# Patient Record
Sex: FEMALE | Race: BLACK OR AFRICAN AMERICAN | NOT HISPANIC OR LATINO | URBAN - METROPOLITAN AREA
[De-identification: names, ages, dates, MRNs, and addresses within clinical notes are randomized per-mention and may not be internally consistent; named-entity substitution may affect disease eponyms.]

---

## 2024-10-29 ENCOUNTER — TELEPHONE (OUTPATIENT)
Dept: OBSTETRICS AND GYNECOLOGY | Facility: CLINIC | Age: 24
End: 2024-10-29
Payer: MEDICAID

## 2024-10-31 ENCOUNTER — TELEPHONE (OUTPATIENT)
Dept: OBSTETRICS AND GYNECOLOGY | Facility: CLINIC | Age: 24
End: 2024-10-31

## 2024-10-31 ENCOUNTER — CLINICAL SUPPORT (OUTPATIENT)
Dept: OBSTETRICS AND GYNECOLOGY | Facility: CLINIC | Age: 24
End: 2024-10-31
Payer: MEDICAID

## 2024-10-31 DIAGNOSIS — Z32.00 POSSIBLE PREGNANCY: Primary | ICD-10-CM

## 2024-10-31 PROCEDURE — 99212 OFFICE O/P EST SF 10 MIN: CPT | Mod: PBBFAC

## 2024-10-31 PROCEDURE — 99999 PR PBB SHADOW E&M-EST. PATIENT-LVL II: CPT | Mod: PBBFAC,,,

## 2024-11-14 ENCOUNTER — OFFICE VISIT (OUTPATIENT)
Dept: OBSTETRICS AND GYNECOLOGY | Facility: CLINIC | Age: 24
End: 2024-11-14
Payer: MEDICAID

## 2024-11-14 ENCOUNTER — PROCEDURE VISIT (OUTPATIENT)
Dept: OBSTETRICS AND GYNECOLOGY | Facility: CLINIC | Age: 24
End: 2024-11-14
Payer: MEDICAID

## 2024-11-14 VITALS
WEIGHT: 142 LBS | BODY MASS INDEX: 25.16 KG/M2 | HEIGHT: 63 IN | SYSTOLIC BLOOD PRESSURE: 110 MMHG | DIASTOLIC BLOOD PRESSURE: 64 MMHG

## 2024-11-14 DIAGNOSIS — Z32.01 POSITIVE PREGNANCY TEST: Primary | ICD-10-CM

## 2024-11-14 DIAGNOSIS — Z32.00 POSSIBLE PREGNANCY: ICD-10-CM

## 2024-11-14 DIAGNOSIS — J45.909 CHILDHOOD ASTHMA WITHOUT COMPLICATION, UNSPECIFIED ASTHMA SEVERITY, UNSPECIFIED WHETHER PERSISTENT: ICD-10-CM

## 2024-11-14 PROCEDURE — 87086 URINE CULTURE/COLONY COUNT: CPT | Performed by: ADVANCED PRACTICE MIDWIFE

## 2024-11-14 PROCEDURE — 99999 PR PBB SHADOW E&M-EST. PATIENT-LVL III: CPT | Mod: PBBFAC,,, | Performed by: ADVANCED PRACTICE MIDWIFE

## 2024-11-14 PROCEDURE — 99213 OFFICE O/P EST LOW 20 MIN: CPT | Mod: PBBFAC,TH,PO | Performed by: ADVANCED PRACTICE MIDWIFE

## 2024-11-14 PROCEDURE — 76801 OB US < 14 WKS SINGLE FETUS: CPT | Mod: PBBFAC,PO | Performed by: OBSTETRICS & GYNECOLOGY

## 2024-11-14 PROCEDURE — 87491 CHLMYD TRACH DNA AMP PROBE: CPT | Performed by: ADVANCED PRACTICE MIDWIFE

## 2024-11-14 NOTE — LETTER
November 14, 2024    Genia Fritz  [address] [place] [address]              [place] [facility]  [address] [facility]  [place] [address]  Phone: [phone]    To Whom It May Concern:    Ms. Fritz is currently under our care for pregnancy.  Estimated Date of Delivery: 06/29/2025          Sincerely,    Dana Coats LPN

## 2024-11-14 NOTE — PROGRESS NOTES
CHIEF COMPLAINT:   Patient presents with      Possible Pregnancy        HISTORY OF PRESENT ILLNESS  Genia Fritz 24 y.o.  presents for pregnancy risk assessment.   The patient has no complaints today.  No nausea or vomiting. No bleeding or pain.  Pregnancy was not planned but is desired.  Partner is supportive of pregnancy.  No pets at home.  Works at [email or web address].  Denies domestic abuse.  Denies chemical/pesticide/radiation exposure. Does state that FOB is aware of pregnancy, this is not his first and he will not be involved with the pregnancy.   OB history:      LMP: Patient's last menstrual period was 2024 (exact date).  CHRISTOPHE: 24  EGA: 7w4d      Health Maintenance   Topic Date Due    Hepatitis C Screening  Never done    Lipid Panel  Never done    TETANUS VACCINE  2020    Pap Smear  Never done       Past Medical History:   Diagnosis Date    Mild intermittent asthma, uncomplicated        History reviewed. No pertinent surgical history.    Family History   Problem Relation Name Age of Onset    Hypertension Father      Diabetes Father         Social History     Socioeconomic History    Marital status: Single   Tobacco Use    Smoking status: Never    Smokeless tobacco: Never   Substance and Sexual Activity    Alcohol use: Not Currently    Drug use: Never    Sexual activity: Yes     Partners: Male       Current Outpatient Medications   Medication Sig Dispense Refill    prenatal 25/iron/folate 6/dha (PRENA1 ORAL) Take by mouth.       No current facility-administered medications for this visit.       Review of patient's allergies indicates:  No Known Allergies      PHYSICAL EXAM   Vitals:    24 0903   BP: 110/64        PAIN SCALE: 0/10 None    PHYSICAL EXAM    ROS:  GENERAL: No fever, chills, fatigability or weight loss.  CV: Denies chest pain  PULM: Denies shortness of breath or wheezing.  ABDOMEN: Appetite fine. No weight loss. Denies diarrhea, abdominal pain, hematemesis or blood in  stool.  URINARY: No flank pain, dysuria or hematuria.  REPRODUCTIVE: No abnormal vaginal bleeding.       PE:   APPEARANCE: Well nourished, well developed, in no acute distress  CHEST: Clear to auscultation bilaterally  CV: Regular rate and rhythm  ABDOMEN: Soft. No tenderness or masses. No hepatosplenomegaly. No hernias  PELVIC:   VULVA: No lesions. Normal female genitalia.  URETHRAL MEATUS: Normal size and location, no lesions, no prolapse.  URETHRA: No masses, tenderness, prolapse or scarring.  VAGINA: Moist and well rugated, no discharge, no significant cystocele or rectocele.  CERVIX: No lesions, normal diameter, no stenosis, no cervical motion tenderness.   UTERUS: enlarged size, regular shape, mobile, non-tender, normal position, good support.  ADNEXA: No masses, tenderness or CDS nodularity.  ANUS PERINEUM: No lesions, no relaxation, no external hemorrhoids.     UPT +  Ultrasound today with SVIUP, embryo grossly WNL with normal cardiac activity, no fluid seen in cul-de-sac, 7w4d with CHRISTOPHE 6/29/25, reviewed with pt     A/P:     -      Patient was counseled today on A.C.S. Pap guidelines and recommendations for yearly pelvic exams, mammograms and monthly self breast exams; to see her PCP for other health maintenance and pregnancy.   -      Patient's medications and medical history reviewed with patient and implications in pregnancy.   -      Pregnancy course discussed and 'AtoZ' book given. Patient was counseled on proper weight gain based on the [place] [employer] [facility]'s recommendations based on her pre-pregnancy weight. Discussed foods to avoid in pregnancy (i.e. sushi, fish that are high in mercury, deli meat, and unpasteurized cheeses). Discussed prenatal vitamin options (i.e. stool softener, DHA). Discussed potential medical problems in pregnancy.  -     Discussed risk of Toxoplasmosis transmission from pets and reviewed risk reduction techniques.  -     Pt was counseled on exercise in pregnancy and weight  gain recommendations.  -     Oriented to practice including CNM collaboration.   -     Follow-up initial OB  -     Pap and genprobe today  -     Labs today

## 2024-11-15 LAB — BACTERIA UR CULT: NO GROWTH

## 2024-11-16 LAB
C TRACH DNA SPEC QL NAA+PROBE: DETECTED
N GONORRHOEA DNA SPEC QL NAA+PROBE: NOT DETECTED

## 2024-11-25 ENCOUNTER — TELEPHONE (OUTPATIENT)
Dept: OBSTETRICS AND GYNECOLOGY | Facility: CLINIC | Age: 24
End: 2024-11-25
Payer: MEDICAID

## 2024-11-25 DIAGNOSIS — A74.9 CHLAMYDIA INFECTION AFFECTING PREGNANCY IN FIRST TRIMESTER: Primary | ICD-10-CM

## 2024-11-25 DIAGNOSIS — O98.811 CHLAMYDIA INFECTION AFFECTING PREGNANCY IN FIRST TRIMESTER: Primary | ICD-10-CM

## 2024-11-25 RX ORDER — AZITHROMYCIN 500 MG/1
1000 TABLET, FILM COATED ORAL ONCE
Qty: 2 TABLET | Refills: 0 | Status: SHIPPED | OUTPATIENT
Start: 2024-11-25 | End: 2024-11-25

## 2024-11-25 NOTE — TELEPHONE ENCOUNTER
----- Message from [employer] sent at 11/25/2024 11:05 AM CST -----  Contact: Genia  .Type:  Patient Returning Call    Who Called: Mayra   Who Left Message for Patient: Girish   Does the patient know what this is regarding?: Results   Would the patient rather a call back or a response via [employer]?  Call   Best Call Back Number:.[phone]   Additional Information:

## 2024-11-25 NOTE — TELEPHONE ENCOUNTER
Message from Liu Townsend sent at 11/25/2024  7:52 AM CST -----  Please call pt and let her know chlamydia +. Rx sent to pharmacy. Have her and partner treated prior to resuming intercourse. Thank you.  ----- Message -----  From: [employer] [facility]  Sent: 11/15/2024  10:12 PM CST  To: Liu Townsend CNM     Called patient and after verifying with 2 identifiers the above results discussed and patient verbalized understanding that partner needs testing and treatment.

## 2024-11-25 NOTE — TELEPHONE ENCOUNTER
Attempted to contact pt, no answer, left message to return call to office regarding results and instructions.

## 2024-11-25 NOTE — TELEPHONE ENCOUNTER
----- Message from Liu Townsend sent at 11/25/2024  7:52 AM CST -----  Please call pt and let her know chlamydia +. Rx sent to pharmacy. Have her and partner treated prior to resuming intercourse. Thank you.  ----- Message -----  From: [facility] [employer] [facility]  Sent: 11/15/2024  10:12 PM CST  To: Liu Townsend CNM

## 2024-11-25 NOTE — TELEPHONE ENCOUNTER
----- Message from Liu Townsend sent at 11/25/2024  7:52 AM CST -----  Please call pt and let her know chlamydia +. Rx sent to pharmacy. Have her and partner treated prior to resuming intercourse. Thank you.  ----- Message -----  From: [employer] [facility]  Sent: 11/15/2024  10:12 PM CST  To: Liu Townsend CNM

## 2024-12-18 ENCOUNTER — LAB VISIT (OUTPATIENT)
Dept: LAB | Facility: HOSPITAL | Age: 24
End: 2024-12-18
Attending: ADVANCED PRACTICE MIDWIFE
Payer: MEDICAID

## 2024-12-18 ENCOUNTER — INITIAL PRENATAL (OUTPATIENT)
Dept: OBSTETRICS AND GYNECOLOGY | Facility: CLINIC | Age: 24
End: 2024-12-18
Payer: MEDICAID

## 2024-12-18 VITALS — SYSTOLIC BLOOD PRESSURE: 118 MMHG | DIASTOLIC BLOOD PRESSURE: 60 MMHG

## 2024-12-18 DIAGNOSIS — A74.9 CHLAMYDIA INFECTION AFFECTING PREGNANCY IN FIRST TRIMESTER: ICD-10-CM

## 2024-12-18 DIAGNOSIS — O98.811 CHLAMYDIA INFECTION AFFECTING PREGNANCY IN FIRST TRIMESTER: ICD-10-CM

## 2024-12-18 DIAGNOSIS — Z34.01 ENCOUNTER FOR SUPERVISION OF NORMAL FIRST PREGNANCY IN FIRST TRIMESTER: Primary | ICD-10-CM

## 2024-12-18 DIAGNOSIS — Z34.02 ENCOUNTER FOR SUPERVISION OF NORMAL FIRST PREGNANCY IN SECOND TRIMESTER: ICD-10-CM

## 2024-12-18 DIAGNOSIS — Z32.01 POSITIVE PREGNANCY TEST: ICD-10-CM

## 2024-12-18 LAB
BASOPHILS # BLD AUTO: 0.03 K/UL (ref 0–0.2)
BASOPHILS NFR BLD: 0.3 % (ref 0–1.9)
DIFFERENTIAL METHOD BLD: ABNORMAL
EOSINOPHIL # BLD AUTO: 0 K/UL (ref 0–0.5)
EOSINOPHIL NFR BLD: 0.4 % (ref 0–8)
ERYTHROCYTE [DISTWIDTH] IN BLOOD BY AUTOMATED COUNT: 13.8 % (ref 11.5–14.5)
HCT VFR BLD AUTO: 34.2 % (ref 37–48.5)
HGB BLD-MCNC: 11.1 G/DL (ref 12–16)
IMM GRANULOCYTES # BLD AUTO: 0.04 K/UL (ref 0–0.04)
IMM GRANULOCYTES NFR BLD AUTO: 0.4 % (ref 0–0.5)
LYMPHOCYTES # BLD AUTO: 1.9 K/UL (ref 1–4.8)
LYMPHOCYTES NFR BLD: 19 % (ref 18–48)
MCH RBC QN AUTO: 30.5 PG (ref 27–31)
MCHC RBC AUTO-ENTMCNC: 32.5 G/DL (ref 32–36)
MCV RBC AUTO: 94 FL (ref 82–98)
MONOCYTES # BLD AUTO: 0.7 K/UL (ref 0.3–1)
MONOCYTES NFR BLD: 7 % (ref 4–15)
NEUTROPHILS # BLD AUTO: 7.2 K/UL (ref 1.8–7.7)
NEUTROPHILS NFR BLD: 72.9 % (ref 38–73)
NRBC BLD-RTO: 0 /100 WBC
PLATELET # BLD AUTO: 382 K/UL (ref 150–450)
PMV BLD AUTO: 10.4 FL (ref 9.2–12.9)
RBC # BLD AUTO: 3.64 M/UL (ref 4–5.4)
WBC # BLD AUTO: 9.94 K/UL (ref 3.9–12.7)

## 2024-12-18 PROCEDURE — 86593 SYPHILIS TEST NON-TREP QUANT: CPT | Performed by: ADVANCED PRACTICE MIDWIFE

## 2024-12-18 PROCEDURE — 87389 HIV-1 AG W/HIV-1&-2 AB AG IA: CPT | Performed by: ADVANCED PRACTICE MIDWIFE

## 2024-12-18 PROCEDURE — 99213 OFFICE O/P EST LOW 20 MIN: CPT | Mod: TH,S$PBB,, | Performed by: ADVANCED PRACTICE MIDWIFE

## 2024-12-18 PROCEDURE — 86762 RUBELLA ANTIBODY: CPT | Performed by: ADVANCED PRACTICE MIDWIFE

## 2024-12-18 PROCEDURE — 85025 COMPLETE CBC W/AUTO DIFF WBC: CPT | Performed by: ADVANCED PRACTICE MIDWIFE

## 2024-12-18 PROCEDURE — 99999 PR PBB SHADOW E&M-EST. PATIENT-LVL II: CPT | Mod: PBBFAC,,, | Performed by: ADVANCED PRACTICE MIDWIFE

## 2024-12-18 PROCEDURE — 86901 BLOOD TYPING SEROLOGIC RH(D): CPT | Performed by: ADVANCED PRACTICE MIDWIFE

## 2024-12-18 PROCEDURE — 83021 HEMOGLOBIN CHROMOTOGRAPHY: CPT | Performed by: ADVANCED PRACTICE MIDWIFE

## 2024-12-18 PROCEDURE — 99212 OFFICE O/P EST SF 10 MIN: CPT | Mod: PBBFAC,TH,PO | Performed by: ADVANCED PRACTICE MIDWIFE

## 2024-12-18 PROCEDURE — 87491 CHLMYD TRACH DNA AMP PROBE: CPT

## 2024-12-18 PROCEDURE — 80074 ACUTE HEPATITIS PANEL: CPT | Performed by: ADVANCED PRACTICE MIDWIFE

## 2024-12-18 NOTE — PROGRESS NOTES
24 y.o. female  at 12w3d here for NOB exam  denies VB or cramping  Doing well without concerns   First trimester s/s improving  /60   LMP 2024 (Exact Date)   TW lbs   Reviewed prenatal labs, did not go at pregnancy confirmation, will draw today  Genetic testing wants, will collected today  Enrolled in connected moms    1. Encounter for supervision of normal first pregnancy in first trimester  -     Connected MOM Enrollment  -     Assign Connected MOM Program Consent Questionnaire  -     [id number] Plus W/ Sex Chromosome Abnormalities* T21, T18, T13 & Y + X; Future; Expected date: 2024    2. Chlamydia infection affecting pregnancy in first trimester  Overview:  Rx sent 24  YOSELIN 24    Orders:  -     C. trachomatis/N. gonorrhoeae by AMP DNA [facility]; Vagina    3. Encounter for supervision of normal first pregnancy in second trimester       Genprobe collected today   Reviewed warning signs, pregnancy precautions and how/when to call.  RTC x 4 wks, call or present sooner prn.

## 2024-12-19 LAB
ABO + RH BLD: NORMAL
BLD GP AB SCN CELLS X3 SERPL QL: NORMAL
HAV IGM SERPL QL IA: NORMAL
HBV CORE IGM SERPL QL IA: NORMAL
HBV SURFACE AG SERPL QL IA: NORMAL
HCV AB SERPL QL IA: NORMAL
HGB A2 MFR BLD HPLC: 2.7 % (ref 2.2–3.2)
HGB FRACT BLD ELPH-IMP: NORMAL
HGB FRACT BLD ELPH-IMP: NORMAL
HIV 1+2 AB+HIV1 P24 AG SERPL QL IA: NORMAL
RUBV IGG SER-ACNC: 83.2 IU/ML
RUBV IGG SER-IMP: REACTIVE
SPECIMEN OUTDATE: NORMAL
TREPONEMA PALLIDUM IGG+IGM AB [PRESENCE] IN SERUM OR PLASMA BY IMMUNOASSAY: NONREACTIVE

## 2024-12-20 LAB
C TRACH DNA SPEC QL NAA+PROBE: NOT DETECTED
N GONORRHOEA DNA SPEC QL NAA+PROBE: NOT DETECTED

## 2024-12-26 ENCOUNTER — TELEPHONE (OUTPATIENT)
Dept: OBSTETRICS AND GYNECOLOGY | Facility: CLINIC | Age: 24
End: 2024-12-26
Payer: MEDICAID

## 2024-12-26 NOTE — TELEPHONE ENCOUNTER
----- Message from Jahaira sent at 12/26/2024 10:46 AM CST -----  Contact: Genia  Patient calling to receive a call back at  [phone]. Reports wanting results from [facility].

## 2025-01-12 ENCOUNTER — PATIENT MESSAGE (OUTPATIENT)
Dept: OTHER | Facility: OTHER | Age: 25
End: 2025-01-12
Payer: COMMERCIAL

## 2025-01-15 ENCOUNTER — ROUTINE PRENATAL (OUTPATIENT)
Dept: OBSTETRICS AND GYNECOLOGY | Facility: CLINIC | Age: 25
End: 2025-01-15
Payer: COMMERCIAL

## 2025-01-15 VITALS
BODY MASS INDEX: 26.71 KG/M2 | SYSTOLIC BLOOD PRESSURE: 110 MMHG | DIASTOLIC BLOOD PRESSURE: 60 MMHG | WEIGHT: 150.81 LBS

## 2025-01-15 DIAGNOSIS — O98.811 CHLAMYDIA INFECTION AFFECTING PREGNANCY IN FIRST TRIMESTER: ICD-10-CM

## 2025-01-15 DIAGNOSIS — J45.909 CHILDHOOD ASTHMA WITHOUT COMPLICATION, UNSPECIFIED ASTHMA SEVERITY, UNSPECIFIED WHETHER PERSISTENT: ICD-10-CM

## 2025-01-15 DIAGNOSIS — Z34.02 ENCOUNTER FOR SUPERVISION OF NORMAL FIRST PREGNANCY IN SECOND TRIMESTER: Primary | ICD-10-CM

## 2025-01-15 DIAGNOSIS — A74.9 CHLAMYDIA INFECTION AFFECTING PREGNANCY IN FIRST TRIMESTER: ICD-10-CM

## 2025-01-15 DIAGNOSIS — Z36.89 ENCOUNTER FOR FETAL ANATOMIC SURVEY: ICD-10-CM

## 2025-01-15 PROCEDURE — 99213 OFFICE O/P EST LOW 20 MIN: CPT | Mod: PBBFAC,TH,PO | Performed by: ADVANCED PRACTICE MIDWIFE

## 2025-01-15 PROCEDURE — 0502F SUBSEQUENT PRENATAL CARE: CPT | Mod: S$GLB,,, | Performed by: ADVANCED PRACTICE MIDWIFE

## 2025-01-15 PROCEDURE — 99999 PR PBB SHADOW E&M-EST. PATIENT-LVL III: CPT | Mod: PBBFAC,,, | Performed by: ADVANCED PRACTICE MIDWIFE

## 2025-01-15 NOTE — PROGRESS NOTES
24 y.o. female  at 16w3d   Not feeling flutters, denies VB, LOF or cramping  Doing well without concerns, feeling good  /60   Wt 68.4 kg (150 lb 12.7 oz)   LMP 2024 (Exact Date)   BMI 26.71 kg/m²   TW lbs   Genetic testing: MT21 negative  Anatomy scan ordered for nv    1. Encounter for fetal anatomic survey  -     US OB/GYN Procedure ([facility])-Future; Future; Expected date: 02/15/2025    2. Encounter for supervision of normal first pregnancy in second trimester    3. Childhood asthma without complication, unspecified asthma severity, unspecified whether persistent  Overview:  No Meds      4. Chlamydia infection affecting pregnancy in first trimester  Overview:  Rx sent 24  YOSELIN 24 NEGATIVE           Reviewed warning signs, pregnancy precautions and how/when to call.  RTC x 4 wks, call or present sooner prn.

## 2025-01-19 ENCOUNTER — PATIENT MESSAGE (OUTPATIENT)
Dept: OTHER | Facility: OTHER | Age: 25
End: 2025-01-19
Payer: COMMERCIAL

## 2025-02-09 ENCOUNTER — PATIENT MESSAGE (OUTPATIENT)
Dept: OTHER | Facility: OTHER | Age: 25
End: 2025-02-09
Payer: COMMERCIAL

## 2025-02-13 ENCOUNTER — ROUTINE PRENATAL (OUTPATIENT)
Dept: OBSTETRICS AND GYNECOLOGY | Facility: CLINIC | Age: 25
End: 2025-02-13
Payer: COMMERCIAL

## 2025-02-13 ENCOUNTER — PROCEDURE VISIT (OUTPATIENT)
Dept: OBSTETRICS AND GYNECOLOGY | Facility: CLINIC | Age: 25
End: 2025-02-13
Payer: COMMERCIAL

## 2025-02-13 VITALS
DIASTOLIC BLOOD PRESSURE: 60 MMHG | SYSTOLIC BLOOD PRESSURE: 115 MMHG | WEIGHT: 156.06 LBS | BODY MASS INDEX: 27.65 KG/M2

## 2025-02-13 DIAGNOSIS — O43.112 CIRCUMVALLATE PLACENTA IN SECOND TRIMESTER: ICD-10-CM

## 2025-02-13 DIAGNOSIS — Z34.02 ENCOUNTER FOR SUPERVISION OF NORMAL FIRST PREGNANCY IN SECOND TRIMESTER: Primary | ICD-10-CM

## 2025-02-13 DIAGNOSIS — Z36.89 ENCOUNTER FOR FETAL ANATOMIC SURVEY: ICD-10-CM

## 2025-02-13 DIAGNOSIS — J45.909 CHILDHOOD ASTHMA WITHOUT COMPLICATION, UNSPECIFIED ASTHMA SEVERITY, UNSPECIFIED WHETHER PERSISTENT: ICD-10-CM

## 2025-02-13 PROCEDURE — 99999 PR PBB SHADOW E&M-EST. PATIENT-LVL III: CPT | Mod: PBBFAC,,, | Performed by: ADVANCED PRACTICE MIDWIFE

## 2025-02-13 NOTE — PROGRESS NOTES
24 y.o. female  at 20w4d   Feeling flutters/FM, denies VB, LOF or cramping  Doing well without concerns   /60   Wt 70.8 kg (156 lb 1.4 oz)   LMP 2024 (Exact Date)   BMI 27.65 kg/m²   TWG: 15 lbs   Reviewed anatomy Us today with breech presentation, circumvallate placenta, anterior, 3VC, normal insertion, OMAR WNL, EFW 13%, 11oz, CL 37.1mm, anatomy complete, MFM to review, reviewed with pt   Genetic testing: MT21 negative    1. Encounter for supervision of normal first pregnancy in second trimester    2. Childhood asthma without complication, unspecified asthma severity, unspecified whether persistent  Overview:  No Meds      3. Circumvallate placenta in second trimester  Overview:  Discussed with pt, plan for growth scan at 32 weeks           Reviewed warning signs, normal FM,  labor precautions and how/when to call.  RTC x 4 wks, call or present sooner prn.

## 2025-03-09 ENCOUNTER — PATIENT MESSAGE (OUTPATIENT)
Dept: OTHER | Facility: OTHER | Age: 25
End: 2025-03-09
Payer: MEDICAID

## 2025-03-20 ENCOUNTER — ROUTINE PRENATAL (OUTPATIENT)
Dept: OBSTETRICS AND GYNECOLOGY | Facility: CLINIC | Age: 25
End: 2025-03-20
Payer: MEDICAID

## 2025-03-20 VITALS
WEIGHT: 166.69 LBS | SYSTOLIC BLOOD PRESSURE: 108 MMHG | DIASTOLIC BLOOD PRESSURE: 56 MMHG | BODY MASS INDEX: 29.52 KG/M2

## 2025-03-20 DIAGNOSIS — J45.909 CHILDHOOD ASTHMA WITHOUT COMPLICATION, UNSPECIFIED ASTHMA SEVERITY, UNSPECIFIED WHETHER PERSISTENT: ICD-10-CM

## 2025-03-20 DIAGNOSIS — O43.112 CIRCUMVALLATE PLACENTA IN SECOND TRIMESTER: ICD-10-CM

## 2025-03-20 DIAGNOSIS — Z34.02 ENCOUNTER FOR SUPERVISION OF NORMAL FIRST PREGNANCY IN SECOND TRIMESTER: Primary | ICD-10-CM

## 2025-03-20 PROCEDURE — 99999 PR PBB SHADOW E&M-EST. PATIENT-LVL III: CPT | Mod: PBBFAC,,, | Performed by: ADVANCED PRACTICE MIDWIFE

## 2025-03-20 PROCEDURE — 99213 OFFICE O/P EST LOW 20 MIN: CPT | Mod: PBBFAC,TH,PO | Performed by: ADVANCED PRACTICE MIDWIFE

## 2025-03-23 ENCOUNTER — PATIENT MESSAGE (OUTPATIENT)
Dept: OTHER | Facility: OTHER | Age: 25
End: 2025-03-23
Payer: MEDICAID

## 2025-03-24 NOTE — PROGRESS NOTES
24 y.o. female  at 25w4d   Reports + FM, denies VB, LOF, or cramping  Doing well without concerns   BP (!) 108/56   Wt 75.6 kg (166 lb 10.7 oz)   LMP 2024 (Exact Date)   BMI 29.52 kg/m²   TW lbs   Reviewed upcoming 28wk labs, (B POS) and orders placed  Tdap handout provided and explained    1. Encounter for supervision of normal first pregnancy in second trimester  -     OB Glucose Screen; Future; Expected date: 2025  -     CBC auto differential; Future; Expected date: 2025  -     Treponema Pallidium Antibodies IgG, IgM; Future; Expected date: 2025  -     HIV 1/2 Ag/Ab (4th Gen); Future; Expected date: 2025    2. Circumvallate placenta in second trimester  Overview:  Discussed with pt, plan for growth scan at 32 weeks      3. Childhood asthma without complication, unspecified asthma severity, unspecified whether persistent  Overview:  No Meds           Reviewed warning signs, normal FM,  labor precautions and how/when to call.  RTC x 4 wks, call or present sooner prn.

## 2025-04-06 ENCOUNTER — PATIENT MESSAGE (OUTPATIENT)
Dept: OTHER | Facility: OTHER | Age: 25
End: 2025-04-06
Payer: MEDICAID

## 2025-04-10 ENCOUNTER — ROUTINE PRENATAL (OUTPATIENT)
Dept: OBSTETRICS AND GYNECOLOGY | Facility: CLINIC | Age: 25
End: 2025-04-10
Payer: MEDICAID

## 2025-04-10 ENCOUNTER — LAB VISIT (OUTPATIENT)
Dept: LAB | Facility: HOSPITAL | Age: 25
End: 2025-04-10
Attending: ADVANCED PRACTICE MIDWIFE
Payer: MEDICAID

## 2025-04-10 VITALS
DIASTOLIC BLOOD PRESSURE: 67 MMHG | BODY MASS INDEX: 31.05 KG/M2 | SYSTOLIC BLOOD PRESSURE: 120 MMHG | WEIGHT: 175.25 LBS

## 2025-04-10 DIAGNOSIS — Z34.03 ENCOUNTER FOR SUPERVISION OF NORMAL FIRST PREGNANCY IN THIRD TRIMESTER: Primary | ICD-10-CM

## 2025-04-10 DIAGNOSIS — Z23 NEED FOR DIPHTHERIA-TETANUS-PERTUSSIS (TDAP) VACCINE: ICD-10-CM

## 2025-04-10 DIAGNOSIS — Z34.02 ENCOUNTER FOR SUPERVISION OF NORMAL FIRST PREGNANCY IN SECOND TRIMESTER: ICD-10-CM

## 2025-04-10 DIAGNOSIS — J45.909 CHILDHOOD ASTHMA WITHOUT COMPLICATION, UNSPECIFIED ASTHMA SEVERITY, UNSPECIFIED WHETHER PERSISTENT: ICD-10-CM

## 2025-04-10 LAB
ABSOLUTE EOSINOPHIL (OHS): 0.03 K/UL
ABSOLUTE MONOCYTE (OHS): 0.81 K/UL (ref 0.3–1)
ABSOLUTE NEUTROPHIL COUNT (OHS): 8.93 K/UL (ref 1.8–7.7)
BASOPHILS # BLD AUTO: 0.05 K/UL
BASOPHILS NFR BLD AUTO: 0.4 %
ERYTHROCYTE [DISTWIDTH] IN BLOOD BY AUTOMATED COUNT: 14.3 % (ref 11.5–14.5)
GLUCOSE SERPL-MCNC: 82 MG/DL (ref 70–140)
HCT VFR BLD AUTO: 33.6 % (ref 37–48.5)
HGB BLD-MCNC: 10.6 GM/DL (ref 12–16)
HIV 1+2 AB+HIV1 P24 AG SERPL QL IA: NORMAL
IMM GRANULOCYTES # BLD AUTO: 0.24 K/UL (ref 0–0.04)
IMM GRANULOCYTES NFR BLD AUTO: 2.1 % (ref 0–0.5)
LYMPHOCYTES # BLD AUTO: 1.54 K/UL (ref 1–4.8)
MCH RBC QN AUTO: 31.6 PG (ref 27–31)
MCHC RBC AUTO-ENTMCNC: 31.5 G/DL (ref 32–36)
MCV RBC AUTO: 100 FL (ref 82–98)
NUCLEATED RBC (/100WBC) (OHS): 0 /100 WBC
PLATELET # BLD AUTO: 321 K/UL (ref 150–450)
PMV BLD AUTO: 10.3 FL (ref 9.2–12.9)
RBC # BLD AUTO: 3.35 M/UL (ref 4–5.4)
RELATIVE EOSINOPHIL (OHS): 0.3 %
RELATIVE LYMPHOCYTE (OHS): 13.3 % (ref 18–48)
RELATIVE MONOCYTE (OHS): 7 % (ref 4–15)
RELATIVE NEUTROPHIL (OHS): 76.9 % (ref 38–73)
T PALLIDUM IGG+IGM SER QL: NORMAL
WBC # BLD AUTO: 11.6 K/UL (ref 3.9–12.7)

## 2025-04-10 PROCEDURE — 99999 PR PBB SHADOW E&M-EST. PATIENT-LVL II: CPT | Mod: PBBFAC,,, | Performed by: ADVANCED PRACTICE MIDWIFE

## 2025-04-10 PROCEDURE — 90471 IMMUNIZATION ADMIN: CPT | Mod: PBBFAC,PO

## 2025-04-10 PROCEDURE — 82950 GLUCOSE TEST: CPT

## 2025-04-10 PROCEDURE — 36415 COLL VENOUS BLD VENIPUNCTURE: CPT | Mod: PO

## 2025-04-10 PROCEDURE — 85025 COMPLETE CBC W/AUTO DIFF WBC: CPT

## 2025-04-10 PROCEDURE — 90715 TDAP VACCINE 7 YRS/> IM: CPT | Mod: PBBFAC,PO

## 2025-04-10 PROCEDURE — 99999PBSHW PR PBB SHADOW TECHNICAL ONLY FILED TO HB: Mod: PBBFAC,,,

## 2025-04-10 PROCEDURE — 99212 OFFICE O/P EST SF 10 MIN: CPT | Mod: PBBFAC,TH,PO,25 | Performed by: ADVANCED PRACTICE MIDWIFE

## 2025-04-10 PROCEDURE — 86593 SYPHILIS TEST NON-TREP QUANT: CPT

## 2025-04-10 PROCEDURE — 87389 HIV-1 AG W/HIV-1&-2 AB AG IA: CPT

## 2025-04-10 RX ADMIN — TETANUS TOXOID, REDUCED DIPHTHERIA TOXOID AND ACELLULAR PERTUSSIS VACCINE, ADSORBED 0.5 ML: 5; 2.5; 8; 8; 2.5 SUSPENSION INTRAMUSCULAR at 09:04

## 2025-04-10 NOTE — PROGRESS NOTES
24 y.o. female  at 28w4d   Reports + FM, denies VB, LOF or CTX  Doing well without concerns   /67   Wt 79.5 kg (175 lb 4.3 oz)   LMP 2024 (Exact Date)   BMI 31.05 kg/m²   TW lbs   28wk labs today (B POS)  Tdap today  Will start visits every 2 weeks    1. Encounter for supervision of normal first pregnancy in third trimester    2. Childhood asthma without complication, unspecified asthma severity, unspecified whether persistent  Overview:  No Meds      3. Need for diphtheria-tetanus-pertussis (Tdap) vaccine  -     Tdap (BOOSTRIX) vaccine injection 0.5 mL       Pedi packet given, and signed up for classes  Reviewed warning signs, normal FKCs,  labor precautions and how/when to call.  RTC x 2 wks, call or present sooner prn.

## 2025-04-20 ENCOUNTER — PATIENT MESSAGE (OUTPATIENT)
Dept: OTHER | Facility: OTHER | Age: 25
End: 2025-04-20
Payer: MEDICAID

## 2025-05-04 ENCOUNTER — PATIENT MESSAGE (OUTPATIENT)
Dept: OTHER | Facility: OTHER | Age: 25
End: 2025-05-04
Payer: MEDICAID

## 2025-05-06 ENCOUNTER — TELEPHONE (OUTPATIENT)
Dept: OBSTETRICS AND GYNECOLOGY | Facility: CLINIC | Age: 25
End: 2025-05-06
Payer: MEDICAID

## 2025-05-06 ENCOUNTER — HOSPITAL ENCOUNTER (OUTPATIENT)
Facility: HOSPITAL | Age: 25
Discharge: HOME | End: 2025-05-06
Attending: OBSTETRICS & GYNECOLOGY | Admitting: OBSTETRICS & GYNECOLOGY
Payer: MEDICAID

## 2025-05-06 VITALS
DIASTOLIC BLOOD PRESSURE: 63 MMHG | TEMPERATURE: 98 F | HEART RATE: 101 BPM | OXYGEN SATURATION: 97 % | SYSTOLIC BLOOD PRESSURE: 106 MMHG | RESPIRATION RATE: 18 BRPM

## 2025-05-06 DIAGNOSIS — O26.899 ABDOMINAL PAIN AFFECTING PREGNANCY: Primary | ICD-10-CM

## 2025-05-06 DIAGNOSIS — R10.9 ABDOMINAL PAIN AFFECTING PREGNANCY: Primary | ICD-10-CM

## 2025-05-06 LAB
BILIRUB UR QL STRIP.AUTO: NEGATIVE
CLARITY UR: ABNORMAL
COLOR UR AUTO: YELLOW
GLUCOSE UR QL STRIP: NEGATIVE
HGB UR QL STRIP: NEGATIVE
HOLD SPECIMEN: NORMAL
KETONES UR QL STRIP: NEGATIVE
LEUKOCYTE ESTERASE UR QL STRIP: NEGATIVE
NITRITE UR QL STRIP: NEGATIVE
PH UR STRIP: 8 [PH]
PROT UR QL STRIP: NEGATIVE
SP GR UR STRIP: 1
UROBILINOGEN UR STRIP-ACNC: NEGATIVE EU/DL

## 2025-05-06 PROCEDURE — 87591 N.GONORRHOEAE DNA AMP PROB: CPT | Performed by: MIDWIFE

## 2025-05-06 PROCEDURE — 63600175 PHARM REV CODE 636 W HCPCS: Performed by: MIDWIFE

## 2025-05-06 PROCEDURE — 99211 OFF/OP EST MAY X REQ PHY/QHP: CPT

## 2025-05-06 PROCEDURE — 59025 FETAL NON-STRESS TEST: CPT | Mod: 26,,, | Performed by: MIDWIFE

## 2025-05-06 PROCEDURE — 99213 OFFICE O/P EST LOW 20 MIN: CPT | Mod: TH,25,, | Performed by: MIDWIFE

## 2025-05-06 PROCEDURE — 59025 FETAL NON-STRESS TEST: CPT

## 2025-05-06 PROCEDURE — 81003 URINALYSIS AUTO W/O SCOPE: CPT | Performed by: MIDWIFE

## 2025-05-06 RX ORDER — SODIUM CHLORIDE, SODIUM LACTATE, POTASSIUM CHLORIDE, CALCIUM CHLORIDE 600; 310; 30; 20 MG/100ML; MG/100ML; MG/100ML; MG/100ML
INJECTION, SOLUTION INTRAVENOUS CONTINUOUS
Status: DISCONTINUED | OUTPATIENT
Start: 2025-05-06 | End: 2025-05-06 | Stop reason: HOSPADM

## 2025-05-06 RX ORDER — ONDANSETRON 8 MG/1
8 TABLET, ORALLY DISINTEGRATING ORAL EVERY 8 HOURS PRN
Status: DISCONTINUED | OUTPATIENT
Start: 2025-05-06 | End: 2025-05-06 | Stop reason: HOSPADM

## 2025-05-06 RX ORDER — ACETAMINOPHEN 500 MG
500 TABLET ORAL EVERY 6 HOURS PRN
Status: DISCONTINUED | OUTPATIENT
Start: 2025-05-06 | End: 2025-05-06 | Stop reason: HOSPADM

## 2025-05-06 RX ORDER — MORPHINE SULFATE 10 MG/ML
10 INJECTION INTRAMUSCULAR; INTRAVENOUS; SUBCUTANEOUS ONCE
Refills: 0 | Status: COMPLETED | OUTPATIENT
Start: 2025-05-06 | End: 2025-05-06

## 2025-05-06 RX ORDER — HYDROXYZINE HYDROCHLORIDE 25 MG/ML
50 INJECTION, SOLUTION INTRAMUSCULAR ONCE
Status: COMPLETED | OUTPATIENT
Start: 2025-05-06 | End: 2025-05-06

## 2025-05-06 RX ADMIN — HYDROXYZINE HYDROCHLORIDE 50 MG: 25 INJECTION, SOLUTION INTRAMUSCULAR at 12:05

## 2025-05-06 RX ADMIN — MORPHINE SULFATE 10 MG: 10 INJECTION, SOLUTION INTRAMUSCULAR; INTRAVENOUS at 12:05

## 2025-05-06 RX ADMIN — SODIUM CHLORIDE, POTASSIUM CHLORIDE, SODIUM LACTATE AND CALCIUM CHLORIDE 1000 ML: 600; 310; 30; 20 INJECTION, SOLUTION INTRAVENOUS at 11:05

## 2025-05-06 NOTE — PROCEDURES
Genia Fritz is a 24 y.o. female patient.    Temp: 98.2 °F (36.8 °C) (05/06/25 0950)  Pulse: 95 (05/06/25 1305)  Resp: 18 (05/06/25 1229)  BP: (!) 95/49 (05/06/25 1305)  SpO2: 97 % (05/06/25 1230)       Obtain Fetal nonstress test (NST)    Date/Time: 5/6/2025 1:13 PM    Performed by: Daisha Cuevas CNM  Authorized by: Daisha Cuevas CNM    Nonstress Test:     Variability:  6-25 BPM    Decelerations:  None    Accelerations:  15 bpm    Acoustic Stimulator: No      Baseline:  140    Uterine Irritability: No      Contractions:  Irregular  Biophysical Profile:     Nonstress Test Interpretation: reactive      Overall Impression:  Reassuring  Post-procedure:     Patient tolerance:  Patient tolerated the procedure well with no immediate complications    5/6/2025

## 2025-05-06 NOTE — HOSPITAL COURSE
Continuous EFM/NST  Wet prep negative  SVE closed  UA pending   1300: UA negative, wet prep negative, s/p IV fluid bolus and Vistaril/Morphine. Cervix closed and thick. Pain much improved. Stable for discharge home. Reviewed s/s of  labor, warning precautions, and when to return to LD triage. Keep scheduled apt on Thursday. NST reactive. Contractions spaced on continuous EFM.

## 2025-05-06 NOTE — H&P
[facility] - Labor & Delivery  Obstetrics  History & Physical    Patient Name: Genia Fritz  MRN: [id number]  Admission Date: 2025  Primary Care Provider: No primary care provider on file.    Subjective:     Principal Problem:Abdominal pain affecting pregnancy    History of Present Illness:  24 y.o.  CHRISTOPHE 2025 EGA 32w2d c/o abdominal pain occurring since last night       Obstetric HPI:  Patient reports yes, contractions, active fetal movement, No vaginal bleeding , No loss of fluid     This pregnancy has been complicated by:  Asthma, chlamydia, circumvallate placenta    OB History    Para Term  AB Living   1 0 0 0 0 0   SAB IAB Ectopic Multiple Live Births   0 0 0 0 0      # Outcome Date GA Lbr Shakir/2nd Weight Sex Type Anes PTL Lv   1 Current              Past Medical History:   Diagnosis Date    Mild intermittent asthma, uncomplicated      History reviewed. No pertinent surgical history.    PTA Medications   Medication Sig    prenatal 25/iron/folate 6/dha (PRENA1 ORAL) Take by mouth.       Review of patient's allergies indicates:  No Known Allergies     Family History       Problem Relation (Age of Onset)    Diabetes Father    Hypertension Father          Tobacco Use    Smoking status: Never    Smokeless tobacco: Never   Substance and Sexual Activity    Alcohol use: Not Currently    Drug use: Never    Sexual activity: Not Currently     Partners: Male     Review of Systems   Gastrointestinal:  Positive for abdominal pain.      Objective:     Vital Signs (Most Recent):  Pulse: 91 (25 1000)  BP: 123/74 (25 1000)  SpO2: 99 % (25 1000) Vital Signs (24h Range):  Pulse:  [] 91  SpO2:  [98 %-99 %] 99 %  BP: (123)/(74-76) 123/74        There is no height or weight on file to calculate BMI.    FHT: 150 Cat 1 (reassuring)  TOCO:  Q 4-5  minutes     Physical Exam:   Constitutional: She is oriented to person, place, and time. She appears well-developed and well-nourished.    HENT:    Head: Normocephalic.    Eyes: Conjunctivae are normal.      Pulmonary/Chest: Effort normal.        Abdominal: Soft.     Genitourinary:    Vagina normal.             Musculoskeletal: Normal range of motion.       Neurological: She is alert and oriented to person, place, and time.    Skin: Skin is warm and dry.    Psychiatric: She has a normal mood and affect. Her behavior is normal. Judgment and thought content normal.        Cervix:  Dilation:  0  Effacement:  50%  Station: -3  Presentation: Unable to determine      Significant Labs:  Lab Results   Component Value Date    GROUPTRH B POS 2024    HEPBSAG Non-reactive 2024       I have personallly reviewed all pertinent lab results from the last 24 hours.  Assessment/Plan:     24 y.o. female  at 32w2d for:    * Abdominal pain affecting pregnancy  Continuous EFM/NST  Wet prep negative  SVE closed  UA pending         Chlamydia infection affecting pregnancy in first trimester  YOSELIN negative  Genprobe ordered         Daisha Cuevas CNM  Obstetrics  [facility] - [facility] & Delivery

## 2025-05-06 NOTE — TELEPHONE ENCOUNTER
Patient wheeled in to office by mom after being dropped off by [facility]. She stated she has been in severe pain since 7 am, explains it come and goes. She denies leakage of fluid and or vaginal pressure. Unable to obtain ride to Labor and Delivery for evaluation due to lack of transportation. Vitals stable with blood pressure of 118/72. [facility] contacted for non- emergent transport to Labor and Delivery for evaluation.

## 2025-05-06 NOTE — SUBJECTIVE & OBJECTIVE
Obstetric HPI:  Patient reports yes, contractions, active fetal movement, No vaginal bleeding , No loss of fluid     This pregnancy has been complicated by:  Asthma, chlamydia, circumvallate placenta    OB History    Para Term  AB Living   1 0 0 0 0 0   SAB IAB Ectopic Multiple Live Births   0 0 0 0 0      # Outcome Date GA Lbr Shakir/2nd Weight Sex Type Anes PTL Lv   1 Current              Past Medical History:   Diagnosis Date    Mild intermittent asthma, uncomplicated      History reviewed. No pertinent surgical history.    PTA Medications   Medication Sig    prenatal 25/iron/folate 6/dha (PRENA1 ORAL) Take by mouth.       Review of patient's allergies indicates:  No Known Allergies     Family History       Problem Relation (Age of Onset)    Diabetes Father    Hypertension Father          Tobacco Use    Smoking status: Never    Smokeless tobacco: Never   Substance and Sexual Activity    Alcohol use: Not Currently    Drug use: Never    Sexual activity: Not Currently     Partners: Male     Review of Systems   Gastrointestinal:  Positive for abdominal pain.      Objective:     Vital Signs (Most Recent):  Pulse: 91 (25 1000)  BP: 123/74 (25 1000)  SpO2: 99 % (25 1000) Vital Signs (24h Range):  Pulse:  [] 91  SpO2:  [98 %-99 %] 99 %  BP: (123)/(74-76) 123/74        There is no height or weight on file to calculate BMI.    FHT: 150 Cat 1 (reassuring)  TOCO:  Q 4-5  minutes     Physical Exam:   Constitutional: She is oriented to person, place, and time. She appears well-developed and well-nourished.    HENT:   Head: Normocephalic.    Eyes: Conjunctivae are normal.      Pulmonary/Chest: Effort normal.        Abdominal: Soft.     Genitourinary:    Vagina normal.             Musculoskeletal: Normal range of motion.       Neurological: She is alert and oriented to person, place, and time.    Skin: Skin is warm and dry.    Psychiatric: She has a normal mood and affect. Her behavior is  normal. Judgment and thought content normal.        Cervix:  Dilation:  0  Effacement:  50%  Station: -3  Presentation: Unable to determine      Significant Labs:  Lab Results   Component Value Date    GROUPTR B POS 12/18/2024    HEPBSAG Non-reactive 12/18/2024       I have personallly reviewed all pertinent lab results from the last 24 hours.

## 2025-05-06 NOTE — DISCHARGE SUMMARY
[facility] - Labor & Delivery  Obstetrics  Discharge Summary      Patient Name: Genia Fritz  MRN: [id number]  Admission Date: 2025  Hospital Length of Stay: 0 days  Discharge Date and Time: 2025 1:12 PM  Attending Physician: Salud Butler MD   Discharging Provider: Daisha Cuevas CNM   Primary Care Provider: No primary care provider on file.    HPI: 24 y.o.  CHRISTOPHE 2025 EGA 32w2d c/o abdominal pain occurring since last night       FHT: 140 Cat 1 (reassuring)  TOCO:  none    * No surgery found *     Hospital Course:   Continuous EFM/NST  Wet prep negative  SVE closed  UA pending   1300: UA negative, wet prep negative, s/p IV fluid bolus and Vistaril/Morphine. Cervix closed and thick. Pain much improved. Stable for discharge home. Reviewed s/s of  labor, warning precautions, and when to return to LD triage. Keep scheduled apt on Thursday. NST reactive. Contractions spaced on continuous EFM.         Final Active Diagnoses:    Diagnosis Date Noted POA    PRINCIPAL PROBLEM:  Abdominal pain affecting pregnancy [O26.899, R10.9] 2025 Yes      Problems Resolved During this Admission:        Significant Diagnostic Studies: Labs: All labs within the past 24 hours have been reviewed      Immunizations       None            This patient has no babies on file.  Pending Diagnostic Studies:       Procedure Component Value Units Date/Time    C. trachomatis/N. gonorrhoeae by AMP DNA [facility]; Urine [[id number]] Collected: 25 1044    Order Status: Sent Lab Status: In process Updated: 25 1053    Specimen: Genital from Urine             Discharged Condition: good    Disposition: Home or Self Care    Follow Up:    Patient Instructions:      Notify your health care provider if you experience any of the following:  temperature >100.4     Notify your health care provider if you experience any of the following:  persistent nausea and vomiting or diarrhea     Notify your health care provider if you  experience any of the following:  severe uncontrolled pain     Notify your health care provider if you experience any of the following:  difficulty breathing or increased cough     Notify your health care provider if you experience any of the following:  severe persistent headache     Notify your health care provider if you experience any of the following:  persistent dizziness, light-headedness, or visual disturbances     Notify your health care provider if you experience any of the following:  increased confusion or weakness     Notify your health care provider if you experience any of the following:   Order Comments: Decreased fetal movement, leaking of fluid, and/or vaginal bleeding     Activity as tolerated     Medications:  Current Discharge Medication List        CONTINUE these medications which have NOT CHANGED    Details   prenatal 25/iron/folate 6/dha (PRENA1 ORAL) Take by mouth.             Daisha Cuevas CNM  Obstetrics  [facility] - Labor & Delivery

## 2025-05-08 ENCOUNTER — ROUTINE PRENATAL (OUTPATIENT)
Dept: OBSTETRICS AND GYNECOLOGY | Facility: CLINIC | Age: 25
End: 2025-05-08
Payer: MEDICAID

## 2025-05-08 VITALS
DIASTOLIC BLOOD PRESSURE: 67 MMHG | SYSTOLIC BLOOD PRESSURE: 120 MMHG | WEIGHT: 177.94 LBS | BODY MASS INDEX: 31.52 KG/M2

## 2025-05-08 DIAGNOSIS — Z36.89 ENCOUNTER FOR ULTRASOUND TO ASSESS FETAL GROWTH: ICD-10-CM

## 2025-05-08 DIAGNOSIS — Z34.03 ENCOUNTER FOR SUPERVISION OF NORMAL FIRST PREGNANCY IN THIRD TRIMESTER: Primary | ICD-10-CM

## 2025-05-08 DIAGNOSIS — O43.113 CIRCUMVALLATE PLACENTA IN THIRD TRIMESTER: ICD-10-CM

## 2025-05-08 DIAGNOSIS — J45.909 CHILDHOOD ASTHMA WITHOUT COMPLICATION, UNSPECIFIED ASTHMA SEVERITY, UNSPECIFIED WHETHER PERSISTENT: ICD-10-CM

## 2025-05-08 PROBLEM — R10.9 ABDOMINAL PAIN AFFECTING PREGNANCY: Status: RESOLVED | Noted: 2025-05-06 | Resolved: 2025-05-08

## 2025-05-08 PROBLEM — O26.899 ABDOMINAL PAIN AFFECTING PREGNANCY: Status: RESOLVED | Noted: 2025-05-06 | Resolved: 2025-05-08

## 2025-05-08 PROCEDURE — 99212 OFFICE O/P EST SF 10 MIN: CPT | Mod: PBBFAC,TH,PO | Performed by: ADVANCED PRACTICE MIDWIFE

## 2025-05-08 PROCEDURE — 99999 PR PBB SHADOW E&M-EST. PATIENT-LVL II: CPT | Mod: PBBFAC,,, | Performed by: ADVANCED PRACTICE MIDWIFE

## 2025-05-08 PROCEDURE — 99214 OFFICE O/P EST MOD 30 MIN: CPT | Mod: TH,S$PBB,, | Performed by: ADVANCED PRACTICE MIDWIFE

## 2025-05-10 LAB
C TRACH DNA SPEC QL NAA+PROBE: NOT DETECTED
CTGC SOURCE (OHS) ORD-325: NORMAL
N GONORRHOEA DNA UR QL NAA+PROBE: NOT DETECTED

## 2025-05-13 NOTE — PROGRESS NOTES
24 y.o. female  at 32w4d   Reports + FM, denies VB, LOF or CTX  Doing well without concerns, discussed maternity leave   /67   Wt 80.7 kg (177 lb 14.6 oz)   LMP 2024 (Exact Date)   BMI 31.52 kg/m²   TW lbs   Reviewed 28wk lab results (B POS)  Tdap given at previous visit  U/s at nv for growth     1. Encounter for supervision of normal first pregnancy in third trimester    2. Encounter for ultrasound to assess fetal growth  -     US OB/GYN Procedure ([facility])-Future; Future    3. Circumvallate placenta in third trimester  Overview:  Discussed with pt, plan for growth scan at 32 weeks      4. Childhood asthma without complication, unspecified asthma severity, unspecified whether persistent  Overview:  No Meds      Reviewed warning signs, normal FKCs,  labor precautions and how/when to call.  RTC x 2 wks, call or present sooner prn.

## 2025-05-22 ENCOUNTER — PROCEDURE VISIT (OUTPATIENT)
Dept: OBSTETRICS AND GYNECOLOGY | Facility: CLINIC | Age: 25
End: 2025-05-22
Payer: MEDICAID

## 2025-05-22 ENCOUNTER — ROUTINE PRENATAL (OUTPATIENT)
Dept: OBSTETRICS AND GYNECOLOGY | Facility: CLINIC | Age: 25
End: 2025-05-22
Payer: MEDICAID

## 2025-05-22 VITALS
BODY MASS INDEX: 32.22 KG/M2 | WEIGHT: 181.88 LBS | DIASTOLIC BLOOD PRESSURE: 64 MMHG | SYSTOLIC BLOOD PRESSURE: 122 MMHG

## 2025-05-22 DIAGNOSIS — Z36.89 ENCOUNTER FOR ULTRASOUND TO ASSESS FETAL GROWTH: ICD-10-CM

## 2025-05-22 DIAGNOSIS — J45.909 CHILDHOOD ASTHMA WITHOUT COMPLICATION, UNSPECIFIED ASTHMA SEVERITY, UNSPECIFIED WHETHER PERSISTENT: ICD-10-CM

## 2025-05-22 DIAGNOSIS — O43.113 CIRCUMVALLATE PLACENTA IN THIRD TRIMESTER: ICD-10-CM

## 2025-05-22 DIAGNOSIS — O36.5930 POOR FETAL GROWTH AFFECTING MANAGEMENT OF MOTHER IN THIRD TRIMESTER, SINGLE OR UNSPECIFIED FETUS: Primary | ICD-10-CM

## 2025-05-22 PROCEDURE — 76819 FETAL BIOPHYS PROFIL W/O NST: CPT | Mod: PBBFAC,PO | Performed by: STUDENT IN AN ORGANIZED HEALTH CARE EDUCATION/TRAINING PROGRAM

## 2025-05-22 PROCEDURE — 76816 OB US FOLLOW-UP PER FETUS: CPT | Mod: 26,S$PBB,, | Performed by: STUDENT IN AN ORGANIZED HEALTH CARE EDUCATION/TRAINING PROGRAM

## 2025-05-22 PROCEDURE — 99999 PR PBB SHADOW E&M-EST. PATIENT-LVL II: CPT | Mod: PBBFAC,,, | Performed by: ADVANCED PRACTICE MIDWIFE

## 2025-05-22 PROCEDURE — 99212 OFFICE O/P EST SF 10 MIN: CPT | Mod: PBBFAC,TH,PO | Performed by: ADVANCED PRACTICE MIDWIFE

## 2025-05-22 PROCEDURE — 76820 UMBILICAL ARTERY ECHO: CPT | Mod: 26,S$PBB,, | Performed by: STUDENT IN AN ORGANIZED HEALTH CARE EDUCATION/TRAINING PROGRAM

## 2025-05-22 NOTE — PROGRESS NOTES
24 y.o. female  at 34w4d  Reports + FM, denies VB, LOF or regular CTX  Doing well without concerns  /64   Wt 82.5 kg (181 lb 14.1 oz)   LMP 2024 (Exact Date)   BMI 32.22 kg/m²   TW lbs   Growth US today with cephalic presentation, anterior/circumvallate placenta, OMAR WNL, MVP 7.0cm, EFW 7%, 4lb 5oz, BPP 8/8, S/D ratios WNL, lagging head and femur, discussed with pt and her mother, delivery timing, 2x weekly testing, stressed FKCs    1. Poor fetal growth affecting management of mother in third trimester, single or unspecified fetus  Overview:  25: EFW 7%    Orders:  -     US OB/GYN Procedure ([facility])-Future; Standing    2. Childhood asthma without complication, unspecified asthma severity, unspecified whether persistent  Overview:  No Meds      3. Circumvallate placenta in third trimester  Overview:  Discussed with pt           Reviewed warning signs, normal FKCs, labor precautions and how/when to call.  RTC x 1 wk, call or present sooner prn.

## 2025-05-27 ENCOUNTER — PROCEDURE VISIT (OUTPATIENT)
Dept: OBSTETRICS AND GYNECOLOGY | Facility: CLINIC | Age: 25
End: 2025-05-27
Payer: MEDICAID

## 2025-05-27 DIAGNOSIS — O36.5930 POOR FETAL GROWTH AFFECTING MANAGEMENT OF MOTHER IN THIRD TRIMESTER, SINGLE OR UNSPECIFIED FETUS: ICD-10-CM

## 2025-05-27 PROCEDURE — 76819 FETAL BIOPHYS PROFIL W/O NST: CPT | Mod: PBBFAC | Performed by: OBSTETRICS & GYNECOLOGY

## 2025-05-29 ENCOUNTER — PROCEDURE VISIT (OUTPATIENT)
Dept: OBSTETRICS AND GYNECOLOGY | Facility: CLINIC | Age: 25
End: 2025-05-29
Payer: MEDICAID

## 2025-05-29 DIAGNOSIS — O36.5930 POOR FETAL GROWTH AFFECTING MANAGEMENT OF MOTHER IN THIRD TRIMESTER, SINGLE OR UNSPECIFIED FETUS: ICD-10-CM

## 2025-05-29 PROCEDURE — 76820 UMBILICAL ARTERY ECHO: CPT | Mod: 26,S$PBB,, | Performed by: OBSTETRICS & GYNECOLOGY

## 2025-05-29 PROCEDURE — 76819 FETAL BIOPHYS PROFIL W/O NST: CPT | Mod: PBBFAC,PO | Performed by: OBSTETRICS & GYNECOLOGY

## 2025-06-05 ENCOUNTER — PROCEDURE VISIT (OUTPATIENT)
Dept: OBSTETRICS AND GYNECOLOGY | Facility: CLINIC | Age: 25
End: 2025-06-05
Payer: MEDICAID

## 2025-06-05 ENCOUNTER — ROUTINE PRENATAL (OUTPATIENT)
Dept: OBSTETRICS AND GYNECOLOGY | Facility: CLINIC | Age: 25
End: 2025-06-05
Payer: MEDICAID

## 2025-06-05 VITALS — BODY MASS INDEX: 32.1 KG/M2 | SYSTOLIC BLOOD PRESSURE: 116 MMHG | WEIGHT: 181.19 LBS | DIASTOLIC BLOOD PRESSURE: 68 MMHG

## 2025-06-05 DIAGNOSIS — J45.909 CHILDHOOD ASTHMA WITHOUT COMPLICATION, UNSPECIFIED ASTHMA SEVERITY, UNSPECIFIED WHETHER PERSISTENT: ICD-10-CM

## 2025-06-05 DIAGNOSIS — O36.5930 POOR FETAL GROWTH AFFECTING MANAGEMENT OF MOTHER IN THIRD TRIMESTER, SINGLE OR UNSPECIFIED FETUS: Primary | ICD-10-CM

## 2025-06-05 DIAGNOSIS — O43.113 CIRCUMVALLATE PLACENTA IN THIRD TRIMESTER: ICD-10-CM

## 2025-06-05 DIAGNOSIS — O36.5930 POOR FETAL GROWTH AFFECTING MANAGEMENT OF MOTHER IN THIRD TRIMESTER, SINGLE OR UNSPECIFIED FETUS: ICD-10-CM

## 2025-06-05 PROCEDURE — 99212 OFFICE O/P EST SF 10 MIN: CPT | Mod: PBBFAC,TH,PO | Performed by: ADVANCED PRACTICE MIDWIFE

## 2025-06-05 PROCEDURE — 76819 FETAL BIOPHYS PROFIL W/O NST: CPT | Mod: 26,S$PBB,, | Performed by: OBSTETRICS & GYNECOLOGY

## 2025-06-05 PROCEDURE — 87653 STREP B DNA AMP PROBE: CPT | Performed by: ADVANCED PRACTICE MIDWIFE

## 2025-06-05 PROCEDURE — 99999 PR PBB SHADOW E&M-EST. PATIENT-LVL II: CPT | Mod: PBBFAC,,, | Performed by: ADVANCED PRACTICE MIDWIFE

## 2025-06-05 PROCEDURE — 76820 UMBILICAL ARTERY ECHO: CPT | Mod: PBBFAC,PO | Performed by: OBSTETRICS & GYNECOLOGY

## 2025-06-06 LAB — GROUP B STREPTOCOCCUS, PCR (OHS): NEGATIVE

## 2025-06-09 ENCOUNTER — HOSPITAL ENCOUNTER (OUTPATIENT)
Facility: HOSPITAL | Age: 25
Discharge: HOME | End: 2025-06-09
Attending: OBSTETRICS & GYNECOLOGY | Admitting: OBSTETRICS & GYNECOLOGY
Payer: MEDICAID

## 2025-06-09 VITALS
HEART RATE: 93 BPM | TEMPERATURE: 98 F | WEIGHT: 181.19 LBS | SYSTOLIC BLOOD PRESSURE: 120 MMHG | DIASTOLIC BLOOD PRESSURE: 79 MMHG | HEIGHT: 63 IN | RESPIRATION RATE: 20 BRPM | BODY MASS INDEX: 32.11 KG/M2

## 2025-06-09 DIAGNOSIS — O36.5930 POOR FETAL GROWTH AFFECTING MANAGEMENT OF MOTHER IN THIRD TRIMESTER, SINGLE OR UNSPECIFIED FETUS: Primary | ICD-10-CM

## 2025-06-09 DIAGNOSIS — O36.5930 POOR FETAL GROWTH AFFECTING MANAGEMENT OF MOTHER IN THIRD TRIMESTER: Primary | ICD-10-CM

## 2025-06-09 DIAGNOSIS — O36.5930 POOR FETAL GROWTH AFFECTING MANAGEMENT OF MOTHER IN THIRD TRIMESTER, SINGLE OR UNSPECIFIED FETUS: ICD-10-CM

## 2025-06-09 PROCEDURE — 59025 FETAL NON-STRESS TEST: CPT | Mod: 26,,,

## 2025-06-09 PROCEDURE — G0378 HOSPITAL OBSERVATION PER HR: HCPCS

## 2025-06-09 PROCEDURE — 59025 FETAL NON-STRESS TEST: CPT

## 2025-06-09 NOTE — DISCHARGE INSTRUCTIONS

## 2025-06-09 NOTE — PROCEDURES
"Genia Fritz is a 24 y.o. female patient.    Temp: 98.4 °F (36.9 °C) (25)  Pulse: 99 (25)  Resp: 18 (25)  BP: 119/77 (25)  Weight: 82.2 kg (181 lb 3.5 oz) (25)  Height: 5' 3" (160 cm) (25)       Fetal non-stress test    Date/Time: 2025 1:57 PM    Performed by: Sheila Arreola CNM  Authorized by: Sheila Arreola CNM    Biophysical Profile:     Fetal Breathin    Fetal Movement:  2    Fetal Tone:  2    Fluid Volume:  2    Nonstress Test:  2    Biophysical Profile Score  (of 8):  8    Biophysical Profile Score  (of 10):  10    OMAR (if indicated) (cm):  16    MVP (Maximal Vertical Pocket):  4.7    Nonstress Test Interpretation: reactive      Overall Impression:  Reassuring  Post-procedure:     Patient tolerance:  Patient tolerated the procedure well with no immediate complications     Dopplers WNL       2025    "

## 2025-06-12 ENCOUNTER — ROUTINE PRENATAL (OUTPATIENT)
Dept: OBSTETRICS AND GYNECOLOGY | Facility: CLINIC | Age: 25
End: 2025-06-12
Payer: MEDICAID

## 2025-06-12 VITALS
WEIGHT: 184.06 LBS | DIASTOLIC BLOOD PRESSURE: 70 MMHG | BODY MASS INDEX: 32.61 KG/M2 | SYSTOLIC BLOOD PRESSURE: 118 MMHG

## 2025-06-12 DIAGNOSIS — O36.5990 PREGNANCY AFFECTED BY FETAL GROWTH RESTRICTION: ICD-10-CM

## 2025-06-12 DIAGNOSIS — Z3A.37 37 WEEKS GESTATION OF PREGNANCY: Primary | ICD-10-CM

## 2025-06-12 DIAGNOSIS — O36.5930 POOR FETAL GROWTH AFFECTING MANAGEMENT OF MOTHER IN THIRD TRIMESTER, SINGLE OR UNSPECIFIED FETUS: ICD-10-CM

## 2025-06-12 DIAGNOSIS — O43.113 CIRCUMVALLATE PLACENTA IN THIRD TRIMESTER: ICD-10-CM

## 2025-06-12 DIAGNOSIS — J45.909 CHILDHOOD ASTHMA WITHOUT COMPLICATION, UNSPECIFIED ASTHMA SEVERITY, UNSPECIFIED WHETHER PERSISTENT: ICD-10-CM

## 2025-06-12 PROCEDURE — 99999 PR PBB SHADOW E&M-EST. PATIENT-LVL II: CPT | Mod: PBBFAC,,,

## 2025-06-12 PROCEDURE — 99212 OFFICE O/P EST SF 10 MIN: CPT | Mod: PBBFAC,TH

## 2025-06-12 RX ORDER — OXYTOCIN-SODIUM CHLORIDE 0.9% IV SOLN 30 UNIT/500ML 30-0.9/5 UT/ML-%
10 SOLUTION INTRAVENOUS ONCE AS NEEDED
OUTPATIENT
Start: 2025-06-12 | End: 2036-11-08

## 2025-06-12 RX ORDER — MUPIROCIN 20 MG/G
OINTMENT TOPICAL
OUTPATIENT
Start: 2025-06-12

## 2025-06-12 RX ORDER — DIPHENOXYLATE HYDROCHLORIDE AND ATROPINE SULFATE 2.5; .025 MG/1; MG/1
2 TABLET ORAL EVERY 6 HOURS PRN
OUTPATIENT
Start: 2025-06-12

## 2025-06-12 RX ORDER — ONDANSETRON 8 MG/1
8 TABLET, ORALLY DISINTEGRATING ORAL EVERY 8 HOURS PRN
OUTPATIENT
Start: 2025-06-12

## 2025-06-12 RX ORDER — MISOPROSTOL 200 UG/1
800 TABLET ORAL ONCE AS NEEDED
OUTPATIENT
Start: 2025-06-12 | End: 2036-11-08

## 2025-06-12 RX ORDER — OXYTOCIN/0.9 % SODIUM CHLORIDE 15/250 ML
95 PLASTIC BAG, INJECTION (ML) INTRAVENOUS ONCE AS NEEDED
OUTPATIENT
Start: 2025-06-12 | End: 2036-11-08

## 2025-06-12 RX ORDER — CALCIUM CARBONATE 200(500)MG
500 TABLET,CHEWABLE ORAL 3 TIMES DAILY PRN
OUTPATIENT
Start: 2025-06-12

## 2025-06-12 RX ORDER — CARBOPROST TROMETHAMINE 250 UG/ML
250 INJECTION, SOLUTION INTRAMUSCULAR
OUTPATIENT
Start: 2025-06-12

## 2025-06-12 RX ORDER — MISOPROSTOL 200 UG/1
800 TABLET ORAL ONCE AS NEEDED
OUTPATIENT
Start: 2025-06-12

## 2025-06-12 RX ORDER — OXYTOCIN 10 [USP'U]/ML
10 INJECTION, SOLUTION INTRAMUSCULAR; INTRAVENOUS ONCE AS NEEDED
OUTPATIENT
Start: 2025-06-12 | End: 2036-11-08

## 2025-06-12 RX ORDER — SODIUM CHLORIDE, SODIUM LACTATE, POTASSIUM CHLORIDE, CALCIUM CHLORIDE 600; 310; 30; 20 MG/100ML; MG/100ML; MG/100ML; MG/100ML
INJECTION, SOLUTION INTRAVENOUS CONTINUOUS
OUTPATIENT
Start: 2025-06-12

## 2025-06-12 RX ORDER — METHYLERGONOVINE MALEATE 0.2 MG/ML
200 INJECTION INTRAVENOUS ONCE AS NEEDED
OUTPATIENT
Start: 2025-06-12 | End: 2036-11-08

## 2025-06-12 RX ORDER — SIMETHICONE 80 MG
1 TABLET,CHEWABLE ORAL 4 TIMES DAILY PRN
OUTPATIENT
Start: 2025-06-12

## 2025-06-12 RX ORDER — LIDOCAINE HYDROCHLORIDE 10 MG/ML
10 INJECTION, SOLUTION INFILTRATION; PERINEURAL ONCE AS NEEDED
OUTPATIENT
Start: 2025-06-12 | End: 2036-11-08

## 2025-06-12 RX ORDER — OXYTOCIN-SODIUM CHLORIDE 0.9% IV SOLN 30 UNIT/500ML 30-0.9/5 UT/ML-%
30 SOLUTION INTRAVENOUS ONCE AS NEEDED
OUTPATIENT
Start: 2025-06-12 | End: 2036-11-08

## 2025-06-12 RX ORDER — OXYTOCIN/0.9 % SODIUM CHLORIDE 15/250 ML
95 PLASTIC BAG, INJECTION (ML) INTRAVENOUS CONTINUOUS PRN
OUTPATIENT
Start: 2025-06-12

## 2025-06-12 RX ORDER — TERBUTALINE SULFATE 1 MG/ML
0.25 INJECTION SUBCUTANEOUS
OUTPATIENT
Start: 2025-06-12

## 2025-06-12 NOTE — PROGRESS NOTES
24 y.o. female  at 37w4d   Reports + FM, denies VB, LOF or regular CTX  Doing well without concerns. Ready for baby  VE: soft/posterior/0-50/-4  TW lbs   GBS: Negative    1. 37 weeks gestation of pregnancy  - routine PNC   2. Childhood asthma without complication, unspecified asthma severity, unspecified whether persistent  Overview:  No Meds      3. Circumvallate placenta in third trimester  Overview:  Discussed with pt, plan for growth scan at 32 weeks      4. Poor fetal growth affecting management of mother in third trimester, single or unspecified fetus  Overview:  24: EFW 7%  IOL 25 @ , orders in   cytotec           Reviewed warning signs, normal FKCs, labor precautions and how/when to call. Patient states understanding  IOL 25 @ , call or present sooner prn

## 2025-06-16 ENCOUNTER — HOSPITAL ENCOUNTER (INPATIENT)
Facility: HOSPITAL | Age: 25
LOS: 3 days | Discharge: HOME | End: 2025-06-19
Attending: STUDENT IN AN ORGANIZED HEALTH CARE EDUCATION/TRAINING PROGRAM | Admitting: STUDENT IN AN ORGANIZED HEALTH CARE EDUCATION/TRAINING PROGRAM
Payer: MEDICAID

## 2025-06-16 DIAGNOSIS — O36.5990 PREGNANCY AFFECTED BY FETAL GROWTH RESTRICTION: ICD-10-CM

## 2025-06-16 DIAGNOSIS — O36.5930 POOR FETAL GROWTH AFFECTING MANAGEMENT OF MOTHER IN THIRD TRIMESTER, SINGLE OR UNSPECIFIED FETUS: ICD-10-CM

## 2025-06-16 LAB
ABSOLUTE EOSINOPHIL (OHS): 0.06 K/UL
ABSOLUTE MONOCYTE (OHS): 1.02 K/UL (ref 0.3–1)
ABSOLUTE NEUTROPHIL COUNT (OHS): 6.24 K/UL (ref 1.8–7.7)
ALBUMIN SERPL BCP-MCNC: 3.1 G/DL (ref 3.5–5.2)
ALP SERPL-CCNC: 110 UNIT/L (ref 40–150)
ALT SERPL W/O P-5'-P-CCNC: 9 UNIT/L (ref 10–44)
ANION GAP (OHS): 9 MMOL/L (ref 8–16)
AST SERPL-CCNC: 17 UNIT/L (ref 11–45)
BASOPHILS # BLD AUTO: 0.02 K/UL
BASOPHILS NFR BLD AUTO: 0.2 %
BILIRUB SERPL-MCNC: 0.5 MG/DL (ref 0.1–1)
BUN SERPL-MCNC: 5 MG/DL (ref 6–20)
CALCIUM SERPL-MCNC: 9.3 MG/DL (ref 8.7–10.5)
CHLORIDE SERPL-SCNC: 109 MMOL/L (ref 95–110)
CO2 SERPL-SCNC: 18 MMOL/L (ref 23–29)
CREAT SERPL-MCNC: 0.6 MG/DL (ref 0.5–1.4)
CREAT UR-MCNC: 128.6 MG/DL (ref 15–325)
ERYTHROCYTE [DISTWIDTH] IN BLOOD BY AUTOMATED COUNT: 13.1 % (ref 11.5–14.5)
GFR SERPLBLD CREATININE-BSD FMLA CKD-EPI: >60 ML/MIN/1.73/M2
GLUCOSE SERPL-MCNC: 79 MG/DL (ref 70–110)
GROUP & RH: NORMAL
HCT VFR BLD AUTO: 35.8 % (ref 37–48.5)
HGB BLD-MCNC: 12.2 GM/DL (ref 12–16)
HIV 1+2 AB+HIV1 P24 AG SERPL QL IA: NEGATIVE
IMM GRANULOCYTES # BLD AUTO: 0.12 K/UL (ref 0–0.04)
IMM GRANULOCYTES NFR BLD AUTO: 1.2 % (ref 0–0.5)
INDIRECT COOMBS: NORMAL
LYMPHOCYTES # BLD AUTO: 2.21 K/UL (ref 1–4.8)
MCH RBC QN AUTO: 31.9 PG (ref 27–31)
MCHC RBC AUTO-ENTMCNC: 34.1 G/DL (ref 32–36)
MCV RBC AUTO: 94 FL (ref 82–98)
NUCLEATED RBC (/100WBC) (OHS): 0 /100 WBC
PLATELET # BLD AUTO: 301 K/UL (ref 150–450)
PMV BLD AUTO: 10.6 FL (ref 9.2–12.9)
POTASSIUM SERPL-SCNC: 3.8 MMOL/L (ref 3.5–5.1)
PROT SERPL-MCNC: 7.3 GM/DL (ref 6–8.4)
PROT UR-MCNC: 10 MG/DL
PROT/CREAT UR: 0.08 MG/G{CREAT}
RBC # BLD AUTO: 3.82 M/UL (ref 4–5.4)
RELATIVE EOSINOPHIL (OHS): 0.6 %
RELATIVE LYMPHOCYTE (OHS): 22.9 % (ref 18–48)
RELATIVE MONOCYTE (OHS): 10.5 % (ref 4–15)
RELATIVE NEUTROPHIL (OHS): 64.6 % (ref 38–73)
SODIUM SERPL-SCNC: 136 MMOL/L (ref 136–145)
WBC # BLD AUTO: 9.67 K/UL (ref 3.9–12.7)

## 2025-06-16 PROCEDURE — 87389 HIV-1 AG W/HIV-1&-2 AB AG IA: CPT

## 2025-06-16 PROCEDURE — 25000003 PHARM REV CODE 250

## 2025-06-16 PROCEDURE — 82570 ASSAY OF URINE CREATININE: CPT

## 2025-06-16 PROCEDURE — 86900 BLOOD TYPING SEROLOGIC ABO: CPT

## 2025-06-16 PROCEDURE — 72100002 HC LABOR CARE, 1ST 8 HOURS

## 2025-06-16 PROCEDURE — 85025 COMPLETE CBC W/AUTO DIFF WBC: CPT

## 2025-06-16 PROCEDURE — 80053 COMPREHEN METABOLIC PANEL: CPT

## 2025-06-16 PROCEDURE — 86593 SYPHILIS TEST NON-TREP QUANT: CPT

## 2025-06-16 PROCEDURE — 11000001 HC ACUTE MED/SURG PRIVATE ROOM

## 2025-06-16 PROCEDURE — 63600175 PHARM REV CODE 636 W HCPCS

## 2025-06-16 RX ORDER — DIPHENOXYLATE HYDROCHLORIDE AND ATROPINE SULFATE 2.5; .025 MG/1; MG/1
2 TABLET ORAL EVERY 6 HOURS PRN
Status: DISCONTINUED | OUTPATIENT
Start: 2025-06-16 | End: 2025-06-17

## 2025-06-16 RX ORDER — METHYLERGONOVINE MALEATE 0.2 MG/ML
200 INJECTION INTRAVENOUS ONCE AS NEEDED
Status: DISCONTINUED | OUTPATIENT
Start: 2025-06-16 | End: 2025-06-17

## 2025-06-16 RX ORDER — OXYTOCIN-SODIUM CHLORIDE 0.9% IV SOLN 30 UNIT/500ML 30-0.9/5 UT/ML-%
30 SOLUTION INTRAVENOUS ONCE AS NEEDED
Status: DISCONTINUED | OUTPATIENT
Start: 2025-06-16 | End: 2025-06-17

## 2025-06-16 RX ORDER — CARBOPROST TROMETHAMINE 250 UG/ML
250 INJECTION, SOLUTION INTRAMUSCULAR
Status: DISCONTINUED | OUTPATIENT
Start: 2025-06-16 | End: 2025-06-17

## 2025-06-16 RX ORDER — LIDOCAINE HYDROCHLORIDE 10 MG/ML
10 INJECTION, SOLUTION INFILTRATION; PERINEURAL ONCE AS NEEDED
Status: CANCELLED | OUTPATIENT
Start: 2025-06-16 | End: 2036-11-12

## 2025-06-16 RX ORDER — OXYTOCIN-SODIUM CHLORIDE 0.9% IV SOLN 30 UNIT/500ML 30-0.9/5 UT/ML-%
95 SOLUTION INTRAVENOUS ONCE AS NEEDED
Status: DISCONTINUED | OUTPATIENT
Start: 2025-06-16 | End: 2025-06-17

## 2025-06-16 RX ORDER — OXYTOCIN 10 [USP'U]/ML
10 INJECTION, SOLUTION INTRAMUSCULAR; INTRAVENOUS ONCE AS NEEDED
Status: DISCONTINUED | OUTPATIENT
Start: 2025-06-16 | End: 2025-06-17

## 2025-06-16 RX ORDER — TRANEXAMIC ACID 10 MG/ML
1000 INJECTION, SOLUTION INTRAVENOUS EVERY 30 MIN PRN
Status: DISCONTINUED | OUTPATIENT
Start: 2025-06-16 | End: 2025-06-17

## 2025-06-16 RX ORDER — MISOPROSTOL 200 UG/1
800 TABLET ORAL ONCE AS NEEDED
Status: DISCONTINUED | OUTPATIENT
Start: 2025-06-16 | End: 2025-06-17

## 2025-06-16 RX ORDER — TERBUTALINE SULFATE 1 MG/ML
0.25 INJECTION SUBCUTANEOUS
Status: DISCONTINUED | OUTPATIENT
Start: 2025-06-16 | End: 2025-06-17

## 2025-06-16 RX ORDER — MUPIROCIN 20 MG/G
OINTMENT TOPICAL
Status: DISCONTINUED | OUTPATIENT
Start: 2025-06-16 | End: 2025-06-17

## 2025-06-16 RX ORDER — SIMETHICONE 80 MG
1 TABLET,CHEWABLE ORAL 4 TIMES DAILY PRN
Status: DISCONTINUED | OUTPATIENT
Start: 2025-06-16 | End: 2025-06-17

## 2025-06-16 RX ORDER — ONDANSETRON 8 MG/1
8 TABLET, ORALLY DISINTEGRATING ORAL EVERY 8 HOURS PRN
Status: DISCONTINUED | OUTPATIENT
Start: 2025-06-16 | End: 2025-06-17

## 2025-06-16 RX ORDER — OXYTOCIN-SODIUM CHLORIDE 0.9% IV SOLN 30 UNIT/500ML 30-0.9/5 UT/ML-%
10 SOLUTION INTRAVENOUS ONCE AS NEEDED
Status: COMPLETED | OUTPATIENT
Start: 2025-06-16 | End: 2025-06-17

## 2025-06-16 RX ORDER — CALCIUM CARBONATE 200(500)MG
500 TABLET,CHEWABLE ORAL 3 TIMES DAILY PRN
Status: DISCONTINUED | OUTPATIENT
Start: 2025-06-16 | End: 2025-06-17

## 2025-06-16 RX ORDER — OXYTOCIN-SODIUM CHLORIDE 0.9% IV SOLN 30 UNIT/500ML 30-0.9/5 UT/ML-%
95 SOLUTION INTRAVENOUS CONTINUOUS PRN
Status: DISCONTINUED | OUTPATIENT
Start: 2025-06-16 | End: 2025-06-17

## 2025-06-16 RX ORDER — LIDOCAINE HYDROCHLORIDE 10 MG/ML
10 INJECTION, SOLUTION INFILTRATION; PERINEURAL ONCE AS NEEDED
Status: DISCONTINUED | OUTPATIENT
Start: 2025-06-16 | End: 2025-06-17

## 2025-06-16 RX ORDER — SODIUM CHLORIDE, SODIUM LACTATE, POTASSIUM CHLORIDE, CALCIUM CHLORIDE 600; 310; 30; 20 MG/100ML; MG/100ML; MG/100ML; MG/100ML
INJECTION, SOLUTION INTRAVENOUS CONTINUOUS
Status: DISCONTINUED | OUTPATIENT
Start: 2025-06-16 | End: 2025-06-17

## 2025-06-16 RX ADMIN — MISOPROSTOL 50 MCG: 100 TABLET ORAL at 08:06

## 2025-06-16 RX ADMIN — SODIUM CHLORIDE, POTASSIUM CHLORIDE, SODIUM LACTATE AND CALCIUM CHLORIDE 300 ML: 600; 310; 30; 20 INJECTION, SOLUTION INTRAVENOUS at 09:06

## 2025-06-16 RX ADMIN — SODIUM CHLORIDE, POTASSIUM CHLORIDE, SODIUM LACTATE AND CALCIUM CHLORIDE: 600; 310; 30; 20 INJECTION, SOLUTION INTRAVENOUS at 09:06

## 2025-06-17 ENCOUNTER — ANESTHESIA EVENT (OUTPATIENT)
Dept: OBSTETRICS AND GYNECOLOGY | Facility: HOSPITAL | Age: 25
End: 2025-06-17
Payer: MEDICAID

## 2025-06-17 ENCOUNTER — ANESTHESIA (OUTPATIENT)
Dept: OBSTETRICS AND GYNECOLOGY | Facility: HOSPITAL | Age: 25
End: 2025-06-17
Payer: MEDICAID

## 2025-06-17 LAB
ALLENS TEST: ABNORMAL
HCO3 UR-SCNC: 18.9 MMOL/L (ref 17–27)
PCO2 BLDA: 33.1 MMHG (ref 32–66)
PH SMN: 7.36 [PH] (ref 7.18–7.38)
PO2 BLDA: 39 MMHG (ref 6–31)
POC BE: -7 MMOL/L (ref -2–2)
POC SATURATED O2: 72 %
SAMPLE: ABNORMAL
SITE: ABNORMAL
T PALLIDUM IGG+IGM SER QL: NORMAL

## 2025-06-17 PROCEDURE — 25000003 PHARM REV CODE 250: Performed by: ADVANCED PRACTICE MIDWIFE

## 2025-06-17 PROCEDURE — 63600175 PHARM REV CODE 636 W HCPCS: Performed by: NURSE ANESTHETIST, CERTIFIED REGISTERED

## 2025-06-17 PROCEDURE — 82803 BLOOD GASES ANY COMBINATION: CPT

## 2025-06-17 PROCEDURE — 27200710 HC EPIDURAL INFUSION PUMP SET: Performed by: STUDENT IN AN ORGANIZED HEALTH CARE EDUCATION/TRAINING PROGRAM

## 2025-06-17 PROCEDURE — 63600175 PHARM REV CODE 636 W HCPCS

## 2025-06-17 PROCEDURE — 3E0DXGC INTRODUCTION OF OTHER THERAPEUTIC SUBSTANCE INTO MOUTH AND PHARYNX, EXTERNAL APPROACH: ICD-10-PCS | Performed by: STUDENT IN AN ORGANIZED HEALTH CARE EDUCATION/TRAINING PROGRAM

## 2025-06-17 PROCEDURE — 0KQM0ZZ REPAIR PERINEUM MUSCLE, OPEN APPROACH: ICD-10-PCS | Performed by: STUDENT IN AN ORGANIZED HEALTH CARE EDUCATION/TRAINING PROGRAM

## 2025-06-17 PROCEDURE — 62326 NJX INTERLAMINAR LMBR/SAC: CPT | Performed by: NURSE ANESTHETIST, CERTIFIED REGISTERED

## 2025-06-17 PROCEDURE — 63600175 PHARM REV CODE 636 W HCPCS: Performed by: STUDENT IN AN ORGANIZED HEALTH CARE EDUCATION/TRAINING PROGRAM

## 2025-06-17 PROCEDURE — 72100003 HC LABOR CARE, EA. ADDL. 8 HRS

## 2025-06-17 PROCEDURE — 10907ZC DRAINAGE OF AMNIOTIC FLUID, THERAPEUTIC FROM PRODUCTS OF CONCEPTION, VIA NATURAL OR ARTIFICIAL OPENING: ICD-10-PCS | Performed by: STUDENT IN AN ORGANIZED HEALTH CARE EDUCATION/TRAINING PROGRAM

## 2025-06-17 PROCEDURE — 25000003 PHARM REV CODE 250

## 2025-06-17 PROCEDURE — 3E033VJ INTRODUCTION OF OTHER HORMONE INTO PERIPHERAL VEIN, PERCUTANEOUS APPROACH: ICD-10-PCS | Performed by: STUDENT IN AN ORGANIZED HEALTH CARE EDUCATION/TRAINING PROGRAM

## 2025-06-17 PROCEDURE — 63600175 PHARM REV CODE 636 W HCPCS: Performed by: ADVANCED PRACTICE MIDWIFE

## 2025-06-17 PROCEDURE — 72200006 HC VAGINAL DELIVERY LEVEL III

## 2025-06-17 PROCEDURE — 51702 INSERT TEMP BLADDER CATH: CPT

## 2025-06-17 PROCEDURE — 59200 INSERT CERVICAL DILATOR: CPT

## 2025-06-17 PROCEDURE — 99499 UNLISTED E&M SERVICE: CPT | Mod: ,,,

## 2025-06-17 PROCEDURE — 99900035 HC TECH TIME PER 15 MIN (STAT)

## 2025-06-17 PROCEDURE — 11000001 HC ACUTE MED/SURG PRIVATE ROOM

## 2025-06-17 PROCEDURE — 36416 COLLJ CAPILLARY BLOOD SPEC: CPT

## 2025-06-17 PROCEDURE — 27800516 HC TRAY, EPIDURAL COMBO: Performed by: STUDENT IN AN ORGANIZED HEALTH CARE EDUCATION/TRAINING PROGRAM

## 2025-06-17 RX ORDER — CARBOPROST TROMETHAMINE 250 UG/ML
250 INJECTION, SOLUTION INTRAMUSCULAR
Status: DISCONTINUED | OUTPATIENT
Start: 2025-06-17 | End: 2025-06-19 | Stop reason: HOSPADM

## 2025-06-17 RX ORDER — OXYTOCIN-SODIUM CHLORIDE 0.9% IV SOLN 30 UNIT/500ML 30-0.9/5 UT/ML-%
95 SOLUTION INTRAVENOUS CONTINUOUS PRN
Status: DISCONTINUED | OUTPATIENT
Start: 2025-06-17 | End: 2025-06-19 | Stop reason: HOSPADM

## 2025-06-17 RX ORDER — FENTANYL CITRATE 50 UG/ML
100 INJECTION, SOLUTION INTRAMUSCULAR; INTRAVENOUS ONCE
Refills: 0 | Status: COMPLETED | OUTPATIENT
Start: 2025-06-17 | End: 2025-06-17

## 2025-06-17 RX ORDER — HYDROCORTISONE 25 MG/G
CREAM TOPICAL 3 TIMES DAILY PRN
Status: DISCONTINUED | OUTPATIENT
Start: 2025-06-17 | End: 2025-06-19 | Stop reason: HOSPADM

## 2025-06-17 RX ORDER — ACETAMINOPHEN 325 MG/1
650 TABLET ORAL EVERY 6 HOURS PRN
Status: DISCONTINUED | OUTPATIENT
Start: 2025-06-17 | End: 2025-06-19 | Stop reason: HOSPADM

## 2025-06-17 RX ORDER — BUPIVACAINE HYDROCHLORIDE 2.5 MG/ML
5 INJECTION, SOLUTION EPIDURAL; INFILTRATION; INTRACAUDAL; PERINEURAL
Status: DISCONTINUED | OUTPATIENT
Start: 2025-06-17 | End: 2025-06-17

## 2025-06-17 RX ORDER — DIPHENHYDRAMINE HYDROCHLORIDE 50 MG/ML
12.5 INJECTION, SOLUTION INTRAMUSCULAR; INTRAVENOUS EVERY 4 HOURS PRN
Status: DISCONTINUED | OUTPATIENT
Start: 2025-06-17 | End: 2025-06-17

## 2025-06-17 RX ORDER — MISOPROSTOL 200 UG/1
800 TABLET ORAL ONCE AS NEEDED
Status: DISCONTINUED | OUTPATIENT
Start: 2025-06-17 | End: 2025-06-19 | Stop reason: HOSPADM

## 2025-06-17 RX ORDER — OXYTOCIN-SODIUM CHLORIDE 0.9% IV SOLN 30 UNIT/500ML 30-0.9/5 UT/ML-%
30 SOLUTION INTRAVENOUS ONCE AS NEEDED
Status: DISCONTINUED | OUTPATIENT
Start: 2025-06-17 | End: 2025-06-19 | Stop reason: HOSPADM

## 2025-06-17 RX ORDER — SODIUM CHLORIDE 0.9 % (FLUSH) 0.9 %
10 SYRINGE (ML) INJECTION
Status: DISCONTINUED | OUTPATIENT
Start: 2025-06-17 | End: 2025-06-19 | Stop reason: HOSPADM

## 2025-06-17 RX ORDER — OXYTOCIN-SODIUM CHLORIDE 0.9% IV SOLN 30 UNIT/500ML 30-0.9/5 UT/ML-%
95 SOLUTION INTRAVENOUS ONCE AS NEEDED
Status: DISCONTINUED | OUTPATIENT
Start: 2025-06-17 | End: 2025-06-19 | Stop reason: HOSPADM

## 2025-06-17 RX ORDER — HYDROCODONE BITARTRATE AND ACETAMINOPHEN 5; 325 MG/1; MG/1
1 TABLET ORAL EVERY 4 HOURS PRN
Status: DISCONTINUED | OUTPATIENT
Start: 2025-06-17 | End: 2025-06-19 | Stop reason: HOSPADM

## 2025-06-17 RX ORDER — DOCUSATE SODIUM 100 MG/1
200 CAPSULE, LIQUID FILLED ORAL 2 TIMES DAILY PRN
Status: DISCONTINUED | OUTPATIENT
Start: 2025-06-17 | End: 2025-06-19 | Stop reason: HOSPADM

## 2025-06-17 RX ORDER — DIPHENHYDRAMINE HCL 25 MG
25 CAPSULE ORAL EVERY 4 HOURS PRN
Status: DISCONTINUED | OUTPATIENT
Start: 2025-06-17 | End: 2025-06-19 | Stop reason: HOSPADM

## 2025-06-17 RX ORDER — DIPHENHYDRAMINE HYDROCHLORIDE 50 MG/ML
25 INJECTION, SOLUTION INTRAMUSCULAR; INTRAVENOUS EVERY 4 HOURS PRN
Status: DISCONTINUED | OUTPATIENT
Start: 2025-06-17 | End: 2025-06-19 | Stop reason: HOSPADM

## 2025-06-17 RX ORDER — DIPHENOXYLATE HYDROCHLORIDE AND ATROPINE SULFATE 2.5; .025 MG/1; MG/1
2 TABLET ORAL EVERY 6 HOURS PRN
Status: DISCONTINUED | OUTPATIENT
Start: 2025-06-17 | End: 2025-06-19 | Stop reason: HOSPADM

## 2025-06-17 RX ORDER — PRENATAL WITH FERROUS FUM AND FOLIC ACID 3080; 920; 120; 400; 22; 1.84; 3; 20; 10; 1; 12; 200; 27; 25; 2 [IU]/1; [IU]/1; MG/1; [IU]/1; MG/1; MG/1; MG/1; MG/1; MG/1; MG/1; UG/1; MG/1; MG/1; MG/1; MG/1
1 TABLET ORAL DAILY
Status: DISCONTINUED | OUTPATIENT
Start: 2025-06-18 | End: 2025-06-19 | Stop reason: HOSPADM

## 2025-06-17 RX ORDER — IBUPROFEN 600 MG/1
600 TABLET, FILM COATED ORAL EVERY 6 HOURS
Status: DISCONTINUED | OUTPATIENT
Start: 2025-06-18 | End: 2025-06-19 | Stop reason: HOSPADM

## 2025-06-17 RX ORDER — TRANEXAMIC ACID 10 MG/ML
1000 INJECTION, SOLUTION INTRAVENOUS EVERY 30 MIN PRN
Status: DISCONTINUED | OUTPATIENT
Start: 2025-06-17 | End: 2025-06-19 | Stop reason: HOSPADM

## 2025-06-17 RX ORDER — SIMETHICONE 80 MG
1 TABLET,CHEWABLE ORAL EVERY 6 HOURS PRN
Status: DISCONTINUED | OUTPATIENT
Start: 2025-06-17 | End: 2025-06-19 | Stop reason: HOSPADM

## 2025-06-17 RX ORDER — LIDOCAINE HYDROCHLORIDE AND EPINEPHRINE 15; 5 MG/ML; UG/ML
INJECTION, SOLUTION EPIDURAL
Status: DISCONTINUED | OUTPATIENT
Start: 2025-06-17 | End: 2025-06-17

## 2025-06-17 RX ORDER — OXYTOCIN 10 [USP'U]/ML
10 INJECTION, SOLUTION INTRAMUSCULAR; INTRAVENOUS ONCE AS NEEDED
Status: DISCONTINUED | OUTPATIENT
Start: 2025-06-17 | End: 2025-06-19 | Stop reason: HOSPADM

## 2025-06-17 RX ORDER — ONDANSETRON 8 MG/1
8 TABLET, ORALLY DISINTEGRATING ORAL EVERY 8 HOURS PRN
Status: DISCONTINUED | OUTPATIENT
Start: 2025-06-17 | End: 2025-06-19 | Stop reason: HOSPADM

## 2025-06-17 RX ORDER — OXYTOCIN-SODIUM CHLORIDE 0.9% IV SOLN 30 UNIT/500ML 30-0.9/5 UT/ML-%
0-32 SOLUTION INTRAVENOUS CONTINUOUS
Status: DISCONTINUED | OUTPATIENT
Start: 2025-06-17 | End: 2025-06-17

## 2025-06-17 RX ORDER — METHYLERGONOVINE MALEATE 0.2 MG/ML
200 INJECTION INTRAVENOUS ONCE AS NEEDED
Status: DISCONTINUED | OUTPATIENT
Start: 2025-06-17 | End: 2025-06-19 | Stop reason: HOSPADM

## 2025-06-17 RX ORDER — ROPIVACAINE HYDROCHLORIDE 2 MG/ML
12 INJECTION, SOLUTION EPIDURAL; INFILTRATION CONTINUOUS
Status: DISCONTINUED | OUTPATIENT
Start: 2025-06-17 | End: 2025-06-17

## 2025-06-17 RX ADMIN — SODIUM CHLORIDE, POTASSIUM CHLORIDE, SODIUM LACTATE AND CALCIUM CHLORIDE 1000 ML: 600; 310; 30; 20 INJECTION, SOLUTION INTRAVENOUS at 08:06

## 2025-06-17 RX ADMIN — Medication 2 MILLI-UNITS/MIN: at 05:06

## 2025-06-17 RX ADMIN — FENTANYL CITRATE 100 MCG: 50 INJECTION INTRAMUSCULAR; INTRAVENOUS at 06:06

## 2025-06-17 RX ADMIN — Medication 2 MILLI-UNITS/MIN: at 01:06

## 2025-06-17 RX ADMIN — OXYTOCIN-SODIUM CHLORIDE 0.9% IV SOLN 30 UNIT/500ML 10 UNITS: 30-0.9/5 SOLUTION at 06:06

## 2025-06-17 RX ADMIN — SODIUM CHLORIDE, POTASSIUM CHLORIDE, SODIUM LACTATE AND CALCIUM CHLORIDE: 600; 310; 30; 20 INJECTION, SOLUTION INTRAVENOUS at 01:06

## 2025-06-17 RX ADMIN — TRANEXAMIC ACID 1000 MG: 10 INJECTION, SOLUTION INTRAVENOUS at 06:06

## 2025-06-17 RX ADMIN — ROPIVACAINE HYDROCHLORIDE 5 ML: 2 INJECTION, SOLUTION EPIDURAL; INFILTRATION at 09:06

## 2025-06-17 RX ADMIN — LIDOCAINE HYDROCHLORIDE,EPINEPHRINE BITARTRATE 3 ML: 15; .005 INJECTION, SOLUTION EPIDURAL; INFILTRATION; INTRACAUDAL; PERINEURAL at 09:06

## 2025-06-17 RX ADMIN — BUPIVACAINE HYDROCHLORIDE 5 ML: 2.5 INJECTION, SOLUTION EPIDURAL; INFILTRATION; INTRACAUDAL; PERINEURAL at 03:06

## 2025-06-17 RX ADMIN — ACETAMINOPHEN 650 MG: 325 TABLET ORAL at 09:06

## 2025-06-17 NOTE — ANESTHESIA PROCEDURE NOTES
Epidural    Patient location during procedure: OB   Reason for block: primary anesthetic   Reason for block: labor analgesia requested by patient and obstetrician  Diagnosis: IUP   Start time: 6/17/2025 9:13 AM  Timeout: 6/17/2025 9:12 AM  End time: 6/17/2025 9:24 AM    Staffing  Performing Provider: Jossue San Jr. CRNA  Authorizing Provider: Joe Virgen MD    Staffing  Performed by: Jossue San Jr., CRNA  Authorized by: Joe Virgen MD        Preanesthetic Checklist  Completed: patient identified, IV checked, site marked, risks and benefits discussed, surgical consent, monitors and equipment checked, pre-op evaluation, timeout performed, anesthesia consent given, hand hygiene performed and patient being monitored  Preparation  Patient position: sitting  Prep: ChloraPrep  Patient monitoring: Pulse Ox and Blood Pressure  Reason for block: primary anesthetic   Epidural  Skin Anesthetic: lidocaine 1%  Skin Wheal: 3 mL  Administration type: continuous  Approach: midline  Interspace: L3-4    Injection technique: DELFINA air  Needle and Epidural Catheter  Needle type: Tuohy   Needle gauge: 17  Needle length: 3.5 inches  Needle insertion depth: 6 cm  Catheter size: 19 G  Catheter at skin depth: 12 cm  Insertion Attempts: 1  Test dose: 3 mL of lidocaine 1.5% with Epi 1-to-200,000  Additional Documentation: incremental injection, no paresthesia on injection, no significant pain on injection, negative aspiration for heme and CSF, no signs/symptoms of IV or SA injection and no significant complaints from patient  Needle localization: anatomical landmarks  Assessment  Upper dermatomal levels - Left: T10  Right: T10   Dermatomal levels determined by alcohol wipe and pinch or prick  Ease of block: easy  Patient's tolerance of the procedure: comfortable throughout block and no complaints No inadvertent dural puncture with Tuohy.  Dural puncture not performed with spinal needle

## 2025-06-17 NOTE — PROGRESS NOTES
S:  sleeping  O:  VS reviewed, afebrile   Vitals:    25 0330 25 0400 25 0430 25 0500   BP: 124/73 119/65 119/73 130/71   Pulse: 84 88 85 81   Resp:       Temp:       TempSrc:       SpO2: 99% 100% 99% 99%   Weight:       Height:           FHTs 135 moderate variability  UC   1-6 min  SVE 1/80/-2, palacios balloon placed without difficulty     A: IUP @ 38w2d ;     Problem List[1]    P:   Continue close monitoring of maternal/fetal status  Pitocin off at 0336 for tachysytole and intermittent late decels  Patient repositioned/IV fluid bolus given  Continued to monitor with cat 1 strip  Pitocin restarted at 0530  Continue POC   anticipate          [1]   Patient Active Problem List  Diagnosis    Childhood asthma without complication    Chlamydia infection affecting pregnancy in first trimester    Poor fetal growth affecting management of mother in third trimester    Circumvallate placenta in third trimester

## 2025-06-17 NOTE — PROGRESS NOTES
S: Resting comfortably on right side, mother at bedside  O:  VS reviewed, afebrile   Vitals:    06/17/25 1300 06/17/25 1315 06/17/25 1330 06/17/25 1345   BP: 120/71 (!) 143/75 129/77 133/77   Pulse: (!) 112 95 92 98   Resp:  16     Temp:  98.6 °F (37 °C)     TempSrc:  Oral     SpO2: 99% 100% 100% 98%   Weight:       Height:           FHTs 145bpm with moderate variability, episodic late decelerations resolved with position change and decrease in pitocin   UC q1.5-2min, pitocin now at 6mu/min  SVE 5/80/-2, vertex, AROM via amnihook with clear fluid noted    A: IUP @ 38w2d ;     Problem List[1]    P:   Continue close monitoring of maternal/fetal status  Continue pitocin per protocol   Anticipate progress and vaginal delivery   Frequent position changes with peanut ball          [1]   Patient Active Problem List  Diagnosis    Childhood asthma without complication    Chlamydia infection affecting pregnancy in first trimester    Poor fetal growth affecting management of mother in third trimester    Circumvallate placenta in third trimester

## 2025-06-17 NOTE — ASSESSMENT & PLAN NOTE
Admit to LD for IOL  VE in office FT/thick/high  Cytotec   Epidural per patient request  Anticipate

## 2025-06-17 NOTE — ANESTHESIA PREPROCEDURE EVALUATION
2025  Genia Fritz is a 24 y.o., female.   38w1d here for IOL for FGR     Pre-op Assessment    I have reviewed the Patient Summary Reports.     I have reviewed the Nursing Notes.    I have reviewed the Medications.     Review of Systems  Anesthesia Hx:  No previous Anesthesia                Social:  Non-Smoker       Hematology/Oncology:  Hematology Normal   Oncology Normal                                   EENT/Dental:  EENT/Dental Normal           Cardiovascular:  Exercise tolerance: good                NYHA Classification I                             Pulmonary:    Asthma                    Renal/:  Renal/ Normal                 Hepatic/GI:  Hepatic/GI Normal                    Musculoskeletal:  Musculoskeletal Normal                Neurological:  Neurology Normal                                      Endocrine:  Endocrine Normal          Obesity / BMI > 30  Dermatological:  Skin Normal    Psych:  Psychiatric Normal                    Physical Exam  General: Well nourished and Cooperative    Airway:  Mallampati: II   Mouth Opening: Normal  Tongue: Normal  Neck ROM: Normal ROM    Chest/Lungs:  Clear to auscultation, Normal Respiratory Rate    Heart:  Rate: Normal  Rhythm: Regular Rhythm        Anesthesia Plan  Type of Anesthesia, risks & benefits discussed:    Anesthesia Type: Epidural, CSE  Intra-op Monitoring Plan: Standard ASA Monitors  Post Op Pain Control Plan: epidural analgesia and multimodal analgesia  Airway Plan: , Post-Induction  Informed Consent: Informed consent signed with the Patient and all parties understand the risks and agree with anesthesia plan.  All questions answered. Patient consented to blood products? Yes  ASA Score: 2  Day of Surgery Review of History & Physical: H&P Update referred to the surgeon/provider.    Ready For Surgery From Anesthesia Perspective.     .

## 2025-06-17 NOTE — PROGRESS NOTES
S: Doing well s/p RODOLFO placement  O:  VS reviewed, afebrile   Vitals:    06/17/25 0937 06/17/25 0940 06/17/25 0945 06/17/25 0952   BP: (!) 147/75 (!) 158/86 139/83 131/80   Pulse: 109 103 106 108   Resp:       Temp:       TempSrc:       SpO2:  99% 100%    Weight:       Height:           FHTs 140bpm with moderate variability and accelerations, cat 1, reassuring  UC q2min  SVE pulled on balloon without give    A: IUP @ 38w2d ;     Problem List[1]    P:   Continue close monitoring of maternal/fetal status  Continue pitocin IOL per protocol   Anticipate progress and vaginal delivery         [1]   Patient Active Problem List  Diagnosis    Childhood asthma without complication    Chlamydia infection affecting pregnancy in first trimester    Poor fetal growth affecting management of mother in third trimester    Circumvallate placenta in third trimester

## 2025-06-17 NOTE — H&P
[facility] - Labor & Delivery  Obstetrics  History & Physical    Patient Name: Genia Fritz  MRN: [id number]  Admission Date: 2025  Primary Care Provider: No primary care provider on file.    Subjective:     Principal Problem:Poor fetal growth affecting management of mother in third trimester    History of Present Illness:  24 y.o.  38w1d here for IOL for FGR       Obstetric HPI:  Patient reports None contractions, active fetal movement, No vaginal bleeding , No loss of fluid     This pregnancy has been complicated by FGR, circumvallate placenta, chlamydia in pregnancy, asthma    OB History    Para Term  AB Living   1 0 0 0 0 0   SAB IAB Ectopic Multiple Live Births   0 0 0 0 0      # Outcome Date GA Lbr Shakir/2nd Weight Sex Type Anes PTL Lv   1 Current              Past Medical History:   Diagnosis Date    Mild intermittent asthma, uncomplicated      No past surgical history on file.    PTA Medications   Medication Sig    prenatal 25/iron/folate 6/dha (PRENA1 ORAL) Take by mouth.       Review of patient's allergies indicates:  No Known Allergies     Family History       Problem Relation (Age of Onset)    Diabetes Father    Hypertension Father          Tobacco Use    Smoking status: Never     Passive exposure: Never    Smokeless tobacco: Never   Substance and Sexual Activity    Alcohol use: Not Currently    Drug use: Never    Sexual activity: Not Currently     Partners: Male     Review of Systems   All other systems reviewed and are negative.     Objective:     Vital Signs (Most Recent):    Vital Signs (24h Range):           There is no height or weight on file to calculate BMI.    FHT: 125Cat 1 (reassuring)  TOCO:  occasional     Physical Exam:   Constitutional: She is oriented to person, place, and time. She appears well-developed and well-nourished.       Cardiovascular:  Normal rate.             Pulmonary/Chest: Effort normal. No respiratory distress.        Abdominal: Soft.     Genitourinary:     Uterus normal.             Musculoskeletal: Moves all extremeties.       Neurological: She is alert and oriented to person, place, and time.    Skin: Skin is warm and dry.    Psychiatric: She has a normal mood and affect.        Cervix:  Dilation:  0-1  Effacement:  50  Station: -4  Presentation: Vertex     Significant Labs:  Lab Results   Component Value Date    [facility]    HEPBSAG Non-reactive 2024       I have personallly reviewed all pertinent lab results from the last 24 hours.  Assessment/Plan:     24 y.o. female  at 38w1d for:    * Poor fetal growth affecting management of mother in third trimester  Admit to LD for IOL  VE in office FT/thick/high  Cytotec   Epidural per patient request  Anticipate      Circumvallate placenta in third trimester  FGR        Sheila Arreola CNM  Obstetrics  [facility] - Labor & Delivery

## 2025-06-17 NOTE — PROGRESS NOTES
S: resting in bed with family at bedside   O:  VS reviewed, afebrile   Vitals:    06/17/25 0330 06/17/25 0400 06/17/25 0430 06/17/25 0500   BP: 124/73 119/65 119/73 130/71   Pulse: 84 88 85 81   Resp:       Temp:       TempSrc:       SpO2: 99% 100% 99% 99%   Weight:       Height:           FHTs 135 moderate variability  UC 1-4 min with irritability in between   SVE no change in cervix     A: IUP @ 38w2d ;     Problem List[1]    P:   Continue close monitoring of maternal/fetal status  Will start pitocin and continue IOL        [1]   Patient Active Problem List  Diagnosis    Childhood asthma without complication    Chlamydia infection affecting pregnancy in first trimester    Poor fetal growth affecting management of mother in third trimester    Circumvallate placenta in third trimester

## 2025-06-17 NOTE — PROGRESS NOTES
S: Resting on left side with mother at bedside  O:  VS reviewed, afebrile   Vitals:    06/17/25 1630 06/17/25 1645 06/17/25 1700 06/17/25 1715   BP: 126/84 129/80 132/89 117/69   Pulse: 81 (!) 54 88 90   Resp:   16    Temp:   98.8 °F (37.1 °C)    TempSrc:   Oral    SpO2: 99% 98% 98% 99%   Weight:       Height:           FHTs 130bpm with moderate variability and early decelerations  UC q1.5-2min  SVE 9.5/100/+2, vertex    A: IUP @ 38w2d ;     Problem List[1]    P:   Continue close monitoring of maternal/fetal status  Continue pitocin per protocol   Anticipate progress and vaginal delivery          [1]   Patient Active Problem List  Diagnosis    Childhood asthma without complication    Chlamydia infection affecting pregnancy in first trimester    Poor fetal growth affecting management of mother in third trimester    Circumvallate placenta in third trimester

## 2025-06-17 NOTE — SUBJECTIVE & OBJECTIVE
Obstetric HPI:  Patient reports None contractions, active fetal movement, No vaginal bleeding , No loss of fluid     This pregnancy has been complicated by FGR, circumvallate placenta, chlamydia in pregnancy, asthma    OB History    Para Term  AB Living   1 0 0 0 0 0   SAB IAB Ectopic Multiple Live Births   0 0 0 0 0      # Outcome Date GA Lbr Shakir/2nd Weight Sex Type Anes PTL Lv   1 Current              Past Medical History:   Diagnosis Date    Mild intermittent asthma, uncomplicated      No past surgical history on file.    PTA Medications   Medication Sig    prenatal 25/iron/folate 6/dha (PRENA1 ORAL) Take by mouth.       Review of patient's allergies indicates:  No Known Allergies     Family History       Problem Relation (Age of Onset)    Diabetes Father    Hypertension Father          Tobacco Use    Smoking status: Never     Passive exposure: Never    Smokeless tobacco: Never   Substance and Sexual Activity    Alcohol use: Not Currently    Drug use: Never    Sexual activity: Not Currently     Partners: Male     Review of Systems   All other systems reviewed and are negative.     Objective:     Vital Signs (Most Recent):    Vital Signs (24h Range):           There is no height or weight on file to calculate BMI.    FHT: 125Cat 1 (reassuring)  TOCO:  occasional     Physical Exam:   Constitutional: She is oriented to person, place, and time. She appears well-developed and well-nourished.       Cardiovascular:  Normal rate.             Pulmonary/Chest: Effort normal. No respiratory distress.        Abdominal: Soft.     Genitourinary:    Uterus normal.             Musculoskeletal: Moves all extremeties.       Neurological: She is alert and oriented to person, place, and time.    Skin: Skin is warm and dry.    Psychiatric: She has a normal mood and affect.        Cervix:  Dilation:  0-1  Effacement:  50  Station: -4  Presentation: Vertex     Significant Labs:  Lab Results   Component Value Date     GROUPTRH B POS 12/18/2024    HEPBSAG Non-reactive 12/18/2024       I have personallly reviewed all pertinent lab results from the last 24 hours.   I have reviewed and confirmed nurses' notes...

## 2025-06-17 NOTE — PROGRESS NOTES
S: resting in bed  O:  VS reviewed, afebrile   There were no vitals filed for this visit.    FHTs 130 moderate variability,   UC toco readjusted   SVE no change     A: IUP @ 38w1d ;     Problem List[1]    P:   Continue close monitoring of maternal/fetal status  7 min prolong due to tetanic contraction. Patient denies pain  Patient repositioned   IV fluid bolus given. Uterus relaxed and FHT returned to normal.  Will continue to monitor closely.   Will also consider starting pitocin instead of continuing with cytotec induction.           [1]   Patient Active Problem List  Diagnosis    Childhood asthma without complication    Chlamydia infection affecting pregnancy in first trimester    Poor fetal growth affecting management of mother in third trimester    Circumvallate placenta in third trimester

## 2025-06-17 NOTE — HOSPITAL COURSE
Admit to LD for IOL  VE in office FT/thick/high  Cytotec   Epidural per patient request  Anticipate    25 PPD1-doing well, routine PP care    25     PPD 2, normal PP course, d/c home today

## 2025-06-18 PROCEDURE — 99231 SBSQ HOSP IP/OBS SF/LOW 25: CPT | Mod: ,,, | Performed by: ADVANCED PRACTICE MIDWIFE

## 2025-06-18 PROCEDURE — 25000003 PHARM REV CODE 250: Performed by: ADVANCED PRACTICE MIDWIFE

## 2025-06-18 PROCEDURE — 11000001 HC ACUTE MED/SURG PRIVATE ROOM

## 2025-06-18 RX ADMIN — PRENATAL VITAMINS-IRON FUMARATE 27 MG IRON-FOLIC ACID 0.8 MG TABLET 1 TABLET: at 09:06

## 2025-06-18 RX ADMIN — IBUPROFEN 600 MG: 600 TABLET ORAL at 05:06

## 2025-06-18 RX ADMIN — DOCUSATE SODIUM 200 MG: 100 CAPSULE, LIQUID FILLED ORAL at 05:06

## 2025-06-18 RX ADMIN — IBUPROFEN 600 MG: 600 TABLET ORAL at 12:06

## 2025-06-18 NOTE — SUBJECTIVE & OBJECTIVE
Interval History: PPD1    She is doing well this morning. She is tolerating a regular diet without nausea or vomiting. She is voiding spontaneously. She is ambulating. She has passed flatus, and has not a BM. Vaginal bleeding is mild. She denies fever or chills. Abdominal pain is mild and controlled with oral medications. She Is breastfeeding.     Objective:     Vital Signs (Most Recent):  Temp: 98.3 °F (36.8 °C) (06/18/25 0707)  Pulse: 87 (06/18/25 0707)  Resp: 17 (06/18/25 0707)  BP: 115/62 (06/18/25 0707)  SpO2: 98 % (06/18/25 0707) Vital Signs (24h Range):  Temp:  [98.1 °F (36.7 °C)-99 °F (37.2 °C)] 98.3 °F (36.8 °C)  Pulse:  [] 87  Resp:  [16-17] 17  SpO2:  [97 %-100 %] 98 %  BP: ()/(56-92) 115/62     Weight: 83.5 kg (184 lb)  Body mass index is 32.59 kg/m².      Intake/Output Summary (Last 24 hours) at 6/18/2025 0907  Last data filed at 6/17/2025 1903  Gross per 24 hour   Intake 365.13 ml   Output 1950 ml   Net -1584.87 ml         Significant Labs:  Lab Results   Component Value Date    GROUPTRH B POS 06/16/2025    HEPBSAG Non-reactive 12/18/2024     Recent Labs   Lab 06/16/25 2029   HGB 12.2   HCT 35.8*       I have personallly reviewed all pertinent lab results from the last 24 hours.  Recent Lab Results         06/17/25  1848        Allens Test N/A       Site Other       POC BE -7       POC HCO3 18.9       POC PCO2 33.1       POC PH 7.364       POC PO2 39       POC SATURATED O2 72       Sample CORD ART               Physical Exam:   Constitutional: She is oriented to person, place, and time. She appears well-developed and well-nourished.     Eyes: Pupils are equal, round, and reactive to light. Conjunctivae are normal.     Cardiovascular:  Normal rate.             Pulmonary/Chest: Effort normal.        Abdominal: Soft.             Musculoskeletal: Normal range of motion and moves all extremeties.       Neurological: She is alert and oriented to person, place, and time.    Skin: Skin is warm and  dry.    Psychiatric: She has a normal mood and affect. Her behavior is normal. Thought content normal.       Review of Systems

## 2025-06-18 NOTE — LACTATION NOTE
Lactation Rounds: Upon entering room, primary infant RN tells nurse that infant fed for 30 min on left breast and mother interested in Hand expression at this time. LC spoke with mother, mother desires for infant to take milk via syringe at this time.     Mother was taught hand expression of breastmilk/colostrum. She was instructed to:  Sit upright and lean forward, if possible.  When feasible, apply warm, wet compress over breasts for a few minutes.   Perform gentle breast massage.  Form a C with her hand and place it about 1 inch back from the areola with the nipple centered between her index finger and her thumb.  Press, compress, relax:  Using her finger and thumb, apply pressure in an inward direction toward the breast without stretching the tissue, compress the breast tissue between her finger and thumb, then relax her finger and thumb. Repeat process for a few minutes.  Rotate placement of finger and thumb on the breasts to facilitate emptying.  Collect expressed breastmilk/colostrum with a spoon or cup and feed immediately to the baby, if able.  If unable to feed immediately, place breastmilk/colostrum directly into a sterile storage container for later use. Place the babys breast milk label (with the date and time of collection and the names of mother's medications) on the container. Reviewed proper handling and storage of expressed breastmilk.   Patient effectively return demonstrated and verbalized understanding. 10mL of EBM collected at this time. Infant fed 3 mL of EBM via syringe with gloved finger. Smacking and clicking sounds noted.     Suck Assessment:   Using a gloved finger, the infant demonstrated:  Suck: fair  Motion:mashing  Cupping: fair      7 remaining mL of EBM labeled with date and time. Placed on RHW in mother's room.     Lactation packet reviewed for days 1-2.  Discussed early feeding cues and encouraged mother to feed baby in response to those cues. Encouraged on demand feedings  and skin to skin.  Reviewed normal feeding expectations of 8 or more feedings per 24 hour period, cues that babies use to signal hunger and satiety and cluster feeding. Discussed the adequacy of colostrum and baby belly size for the first 3 days of life along with expected output.     Discussed risks of introducing a pacifier or artificial nipple and discussed the AAP recommendation to avoid the use of pacifiers until 1 month of age for breastfeeding infants. Information provided to mother about how to obtain breast pump through insurance. [place] department of health electric breast pump request form faxed to [employer] at this time.    Mother states understanding and verbalized appropriate recall. Encouraged mother to call for assistance when desired or when infant is showing signs of hunger, contact number provided, mother verbalizes understanding.

## 2025-06-18 NOTE — LACTATION NOTE
Lactation Rounds:    Breast: right & left      Position [] cross cradle [] cradle [x] football [] laid-back   Gape [] narrow [x] adequate [] wide []    Latch [] shallow [x] moderate [] deep [] difficulty finding nipple    [] successful []unsuccessful [x] required intervention [x] full assist   Lip flange [] top flanged [] bottom flanged [x] top tucked [x] bottom tucked   Oral seal [x] adequate [] poor []    Cheeks [x] round [] dimpled [] broken cheek line [] flat   Jaw [x] piston [] rocker [x] chomping [] fasciculations   Maternal pain [x] none [] mild [] moderate [] severe   Swallow [x] visible [x] audible [] gulping []    Swallow rate [] 2:1 [] high suck to swallow [x] frequent pauses [x]variable   Difficulties [] milk leaking [] choking/coughing [] arching [] unsustained tongue extension    [] clicking [] crease above upper lip [] lip blanching [] fatigue     [] labored breathing [] nasal flaring [] stridor [] increased work of breathing    [x] pop-offs [] vasospasm [x] Breast massage and compression utilized, frequent stimulation required    Minutes fed: 20     Maternal nipple shape  [x] WNL [] lipstick [] compressed [] white line   Baby after feeding [x] content [] sleepy [] showing feeding cues [] alert    []fatigued [] fussy []      Mother verbalizes understanding of all education and counseling. Mother denies any further lactation needs or concerns at this time. Discussed lactation availability. Encouraged mother to call for assistance when needs arise.      Primary nurse Marybel AHUMADA RN updated.

## 2025-06-18 NOTE — PLAN OF CARE
Problem: Adult Inpatient Plan of Care  Goal: Plan of Care Review  Outcome: Ongoing  Goal: Patient-Specific Goal (Individualized)  Outcome: Ongoing  Goal: Absence of Hospital-Acquired Illness or Injury  Outcome: Ongoing  Goal: Optimal Comfort and Wellbeing  Outcome: Ongoing  Goal: Readiness for Transition of Care  Outcome: Ongoing     Problem:  Fall Injury Risk  Goal: Absence of Fall, Infant Drop and Related Injury  Outcome: Ongoing     Problem: Infection  Goal: Absence of Infection Signs and Symptoms  Outcome: Ongoing     Problem: Pain Acute  Goal: Optimal Pain Control and Function  Outcome: Ongoing     Problem: Breastfeeding  Goal: Effective Breastfeeding  Outcome: Ongoing     Problem: Postpartum (Vaginal Delivery)  Goal: Successful Parent Role Transition  Outcome: Ongoing  Goal: Hemostasis  Outcome: Ongoing  Goal: Absence of Infection Signs and Symptoms  Outcome: Ongoing  Goal: Anesthesia/Sedation Recovery  Outcome: Ongoing  Goal: Optimal Pain Control and Function  Outcome: Ongoing  Goal: Effective Urinary Elimination  Outcome: Ongoing     Problem: Skin Injury Risk Increased  Goal: Skin Health and Integrity  Outcome: Ongoing

## 2025-06-18 NOTE — ANESTHESIA POSTPROCEDURE EVALUATION
Anesthesia Post Evaluation    Patient: Genia Fritz    Procedure(s) Performed: * No procedures listed *    Final Anesthesia Type: epidural      Patient location during evaluation: labor & delivery  Patient participation: Yes- Able to Participate  Level of consciousness: awake and alert  Post-procedure vital signs: reviewed and stable  Pain management: adequate  Airway patency: patent    PONV status at discharge: No PONV  Anesthetic complications: no      Cardiovascular status: blood pressure returned to baseline  Respiratory status: unassisted and spontaneous ventilation  Hydration status: euvolemic  Follow-up not needed.              Vitals Value Taken Time   /89 06/17/25 18:04   Temp 37.1 °C (98.8 °F) 06/17/25 17:00   Pulse 98 06/17/25 19:35   Resp 16 06/17/25 17:00   SpO2 100 % 06/17/25 19:35   Vitals shown include unfiled device data.      No case tracking events are documented in the log.      Pain/Cirilo Score: Pain Rating Prior to Med Admin: 8 (6/17/2025  3:00 PM)  Pain Rating Post Med Admin: 3 (6/17/2025  3:24 PM)

## 2025-06-18 NOTE — L&D DELIVERY NOTE
"[facility]  Vaginal Delivery   Operative Note    SUMMARY     Shoulder dystocia vaginal delivery of live infant. 1.5min, released with suprapubic, Rin and manual rotation of anterior shoulder (right), skin to skin was unable to be performed due to non-vigorous .  Infant delivered position HERBIE over intact perineum.  Nuchal cord: No.    Spontaneous delivery of placenta and IV pitocin and TXA given noting good uterine tone.  2nd degree laceration noted and repaired in normal fashion with 3-0 chromic ct.  Patient tolerated delivery well. Sponge needle and lap counted correctly x2.    Indications: Poor fetal growth affecting management of mother in third trimester  Pregnancy complicated by: Problem List[1]  Admitting GA: 38w2d    Delivery Information for Jono Fritz    Birth information:  YOB: 2026   Time of birth: 6:31 PM   Sex: female   Head Delivery Date/Time: 2025  6:30 PM   Delivery type: Vaginal, Spontaneous   Gestational Age: 38w2d       Delivery Providers    Delivering clinician: Liu Townsend CNM   Provider Role    Lolly Baer RN Registered Nurse    Laureen Knapp RN Registered Nurse    Kurt Brady, Nurse Intern Nurse Intern    Scott, Brennan Nurse Intern    Kd, Elena Medical Student    Myah Darden ST Registered Nurse              Measurements    Weight: 3060 g  Weight (lbs): 6 lb 11.9 oz  Length: 50 cm  Length (in): 19.69"  Head circumference: 33.5 cm  Chest circumference: 30 cm  Abdominal girth: 30 cm         Apgars    Living status: Living  Apgar Component Scores:  1 min.:  5 min.:  10 min.:  15 min.:  20 min.:    Skin color:  0  1       Heart rate:  2  2       Reflex irritability:  2  2       Muscle tone:  1  2       Respiratory effort:  2  2       Total:  7  9       Apgars assigned by: ANDRE KNAPP RN/ANDRE CALERO RN         Operative Delivery    Forceps attempted?: No  Vacuum extractor attempted?: No         Shoulder Dystocia  "   Shoulder dystocia present?: Yes  Anterior shoulder: right  Time recognized: 2025 18:30:15  Gentle attempt at traction, assisted by maternal expulsive forces?: Yes  First maneuver: Rin maneuver  First maneuver performed at: 2025 18:30:16  First maneuver performed by: EMERALD Townsend CNM  Second maneuver: suprapubic pressure  Second maneuver performed at: 2025 18:30:25  Second maneuver performed by: BÁRBARA Baer RN  Third maneuver: Woods screw maneuver  Third maneuver performed at: 2025 18:31:20  Third maneuver performed by: EMERALD Townsend CNM           Presentation    Presentation: Vertex  Position: Left Occiput Anterior           Interventions/Resuscitation    Method: Bulb Suctioning, Tactile Stimulation, Deep Suctioning       Cord    Vessels: 3 vessels  Complications: None  Delayed Cord Clamping?: No  Cord Clamped Date/Time: 2025  6:32 PM  Cord Blood Disposition: Lab  Gases Sent?: Yes  Stem Cell Collection (by MD): No       Placenta    Placenta delivery date/time: 2025 18:36  Placenta removal: Spontaneous  Placenta appearance: Intact  Placenta disposition: Discarded           Labor Events:       labor: No     Labor Onset Date/Time: 2025 20:00     Dilation Complete Date/Time: 2025 18:00     Start Pushing Date/Time: 2025 18:05       Start Pushing Date/Time: 2025 18:05     Rupture Date/Time: 25 1424        Rupture type: ARM (Artificial Rupture)        Fluid Amount:       Fluid Color: Clear              steroids: None     Antibiotics given for GBS: No     Induction: amniotomy;balloon dilation (Lino);misoprostol;oxytocin     Indications for induction:  Intrauterine Growth Restriction     Augmentation:       Indications for augmentation:       Labor complications: Shoulder Dystocia     Additional complications:          Cervical ripening:                     Delivery:      Episiotomy: None     Indication for Episiotomy:       Perineal Lacerations:  2nd Repaired:  Yes   Periurethral Laceration:   Repaired:     Labial Laceration:   Repaired:     Sulcus Laceration:   Repaired:     Vaginal Laceration:   Repaired:     Cervical Laceration:   Repaired:     Repair suture:       Repair # of packets: 2     Last Value - EBL - Nursing (mL):       Sum - EBL - Nursing (mL): 0     Last Value - EBL - Anesthesia (mL):      Calculated QBL (mL): 200     Running total QBL (mL): 200     Vaginal Sweep Performed: No     Surgicount Correct: Yes     Vaginal Packing: No Quantity:       Other providers:       Anesthesia    Method: Epidural          Details (if applicable):  Trial of Labor      Categorization:      Priority:     Indications for :     Incision Type:       Additional  information:  Forceps:    Vacuum:    Breech:    Observed anomalies    Other (Comments):              [1]   Patient Active Problem List  Diagnosis    Childhood asthma without complication    Chlamydia infection affecting pregnancy in first trimester    Poor fetal growth affecting management of mother in third trimester    Circumvallate placenta in third trimester    Shoulder dystocia during labor and delivery, delivered    Second degree perineal laceration during delivery    Single liveborn infant delivered vaginally

## 2025-06-18 NOTE — PLAN OF CARE
[facility] ([facility])  Discharge Assessment    Primary Care Provider: No primary care provider on file.     OB Screen (most recent)       OB Screen - 25 0907          OB SCREEN    Assessment Type Discharge Planning Assessment     Source of Information patient;health record     Received Prenatal Care Yes     Any indications/suspicions for None     Is this a teen pregnancy No     Is the baby in NICU No     Indication for adoption/Safe Haven No     Indication for DME/post-acute needs No     HIV (+) No     Any congenital  disorders No     Fetal demise/ death No

## 2025-06-18 NOTE — LACTATION NOTE
Lactation Rounds:    Mother reports 1 wet and 1 dirty.     Discussed mechanism of milk production and maintenance. Encouraged frequent feeds based on early cues, unrestricted access to the breast and frequent skin to skin contact. Discussed expected feeding and output pattern for day of life 2. Reinforced normalcy and importance of cluster feeding.      Electric breastpump from insurance delivered to patients room.  also in patients room. Instructed mother to call lactation when infant is making feeding cues for breastfeeding assessment.     Mother verbalizes understanding of all education and counseling. Mother denies any further lactation needs or concerns at this time. Discussed lactation availability. Encouraged mother to call for assistance when needs arise.

## 2025-06-18 NOTE — LACTATION NOTE
Lactation Rounds:    Mother called for help waking baby up to feed. Infant sleeping in grandmothers arms. Undressed infant, changed wet diaper, infant is awake and making feeding cues. Educated mother on how to wake up a sleepy baby.    Baby is showing feeding cues. Helped mother to settle in a football hold position on the left breast. Reviewed deep asymmetric latch and proper positioning. Mother requires full assistance with latch. Breast massage and compression utilized during feeding as well as frequent stimulation to keep infant engaged in feeding.  Audible swallows noted, and mother denies pain or discomfort. Baby fed until content, and nipple shape is slightly compressed and color is WDL upon unlatching.     Mother verbalizes understanding of all education and counseling. Mother denies any further lactation needs or concerns at this time. Discussed lactation availability. Encouraged mother to call for assistance when needs arise.    Primary nurse Marybel AHUMADA RN updated on need for assistance with each latch attempt.

## 2025-06-18 NOTE — PROGRESS NOTES
[facility] ([facility])  Obstetrics  Postpartum Progress Note    Patient Name: Genia Fritz  MRN: [id number]  Admission Date: 2025  Hospital Length of Stay: 2 days  Attending Physician: Hailee Lang MD  Primary Care Provider: No primary care provider on file.    Subjective:     Principal Problem:Shoulder dystocia during labor and delivery, delivered    Hospital Course:  Admit to LD for IOL  VE in office FT/thick/high  Cytotec   Epidural per patient request  Anticipate    25 PPD1-doing well, routine PP care    Interval History: PPD1    She is doing well this morning. She is tolerating a regular diet without nausea or vomiting. She is voiding spontaneously. She is ambulating. She has passed flatus, and has not a BM. Vaginal bleeding is mild. She denies fever or chills. Abdominal pain is mild and controlled with oral medications. She Is breastfeeding.     Objective:     Vital Signs (Most Recent):  Temp: 98.3 °F (36.8 °C) (25 0707)  Pulse: 87 (25 0707)  Resp: 17 (25 07)  BP: 115/62 (25 0707)  SpO2: 98 % (25 07) Vital Signs (24h Range):  Temp:  [98.1 °F (36.7 °C)-99 °F (37.2 °C)] 98.3 °F (36.8 °C)  Pulse:  [] 87  Resp:  [16-17] 17  SpO2:  [97 %-100 %] 98 %  BP: ()/(56-92) 115/62     Weight: 83.5 kg (184 lb)  Body mass index is 32.59 kg/m².      Intake/Output Summary (Last 24 hours) at 2025 0907  Last data filed at 2025 1903  Gross per 24 hour   Intake 365.13 ml   Output 1950 ml   Net -1584.87 ml         Significant Labs:  Lab Results   Component Value Date    GROUPTRH B POS 2025    HEPBSAG Non-reactive 2024     Recent Labs   Lab 25   HGB 12.2   HCT 35.8*       I have personallly reviewed all pertinent lab results from the last 24 hours.  Recent Lab Results         25  1848        Allens Test N/A       Site Other       POC BE -7       POC HCO3 18.9       POC PCO2 33.1       POC PH 7.364       POC PO2 39       POC  SATURATED O2 72       Sample CORD ART               Physical Exam:   Constitutional: She is oriented to person, place, and time. She appears well-developed and well-nourished.     Eyes: Pupils are equal, round, and reactive to light. Conjunctivae are normal.     Cardiovascular:  Normal rate.             Pulmonary/Chest: Effort normal.        Abdominal: Soft.             Musculoskeletal: Normal range of motion and moves all extremeties.       Neurological: She is alert and oriented to person, place, and time.    Skin: Skin is warm and dry.    Psychiatric: She has a normal mood and affect. Her behavior is normal. Thought content normal.       Review of Systems  Assessment/Plan:     24 y.o. female  for:    * Shoulder dystocia during labor and delivery, delivered  Discussed SD, important to note in medical history for subsequent pregnancies    Single liveborn infant delivered vaginally  Care under peds    Second degree perineal laceration during delivery  Routine PP ronna care    Circumvallate placenta in third trimester  FGR    Poor fetal growth affecting management of mother in third trimester  S/p     Childhood asthma without complication  Remains stable on no meds        Disposition: As patient meets milestones, will plan to discharge tomorrow.    Kaila Cardozo CNM  Obstetrics  [facility] ([facility])

## 2025-06-18 NOTE — PLAN OF CARE
Pt progressing well. Up ad berenice and voiding without difficulty. Pain controlled with po meds. Vitals stable. Bonding with baby.

## 2025-06-19 VITALS
RESPIRATION RATE: 18 BRPM | SYSTOLIC BLOOD PRESSURE: 126 MMHG | HEIGHT: 63 IN | OXYGEN SATURATION: 100 % | HEART RATE: 91 BPM | TEMPERATURE: 99 F | BODY MASS INDEX: 32.6 KG/M2 | WEIGHT: 184 LBS | DIASTOLIC BLOOD PRESSURE: 82 MMHG

## 2025-06-19 PROBLEM — O36.5930 POOR FETAL GROWTH AFFECTING MANAGEMENT OF MOTHER IN THIRD TRIMESTER: Status: RESOLVED | Noted: 2024-12-18 | Resolved: 2025-06-19

## 2025-06-19 PROBLEM — O43.113 CIRCUMVALLATE PLACENTA IN THIRD TRIMESTER: Status: RESOLVED | Noted: 2025-02-13 | Resolved: 2025-06-19

## 2025-06-19 PROCEDURE — 25000003 PHARM REV CODE 250: Performed by: ADVANCED PRACTICE MIDWIFE

## 2025-06-19 RX ADMIN — PRENATAL VITAMINS-IRON FUMARATE 27 MG IRON-FOLIC ACID 0.8 MG TABLET 1 TABLET: at 08:06

## 2025-06-19 RX ADMIN — IBUPROFEN 600 MG: 600 TABLET ORAL at 12:06

## 2025-06-19 RX ADMIN — IBUPROFEN 600 MG: 600 TABLET ORAL at 06:06

## 2025-06-19 RX ADMIN — IBUPROFEN 600 MG: 600 TABLET ORAL at 11:06

## 2025-06-19 NOTE — PLAN OF CARE
Discharge instructions read to patient. Follow up appt discussed. [place] AVS also read to patient who verbalizes understanding.

## 2025-06-19 NOTE — PLAN OF CARE
Patient afebrile and had no falls this shift. Fundus firm without massage and below umbilicus. Bleeding light, no clots passed this shift. Voids spontaneously. Ambulates independently. Pain well controlled with oral pain medication. Vital signs stable at this time. Bonding well with infant; responds to infant cues and participates in infant care. Will continue to monitor.      Problem: Adult Inpatient Plan of Care  Goal: Plan of Care Review  Outcome: Progressing  Goal: Patient-Specific Goal (Individualized)  Outcome: Progressing  Goal: Absence of Hospital-Acquired Illness or Injury  Outcome: Progressing  Goal: Optimal Comfort and Wellbeing  Outcome: Progressing  Goal: Readiness for Transition of Care  Outcome: Progressing     Problem:  Fall Injury Risk  Goal: Absence of Fall, Infant Drop and Related Injury  Outcome: Progressing     Problem: Infection  Goal: Absence of Infection Signs and Symptoms  Outcome: Progressing     Problem: Pain Acute  Goal: Optimal Pain Control and Function  Outcome: Progressing     Problem: Breastfeeding  Goal: Effective Breastfeeding  Outcome: Progressing     Problem: Postpartum (Vaginal Delivery)  Goal: Successful Parent Role Transition  Outcome: Progressing  Goal: Hemostasis  Outcome: Progressing  Goal: Absence of Infection Signs and Symptoms  Outcome: Progressing  Goal: Anesthesia/Sedation Recovery  Outcome: Progressing  Goal: Optimal Pain Control and Function  Outcome: Progressing  Goal: Effective Urinary Elimination  Outcome: Progressing     Problem: Skin Injury Risk Increased  Goal: Skin Health and Integrity  Outcome: Progressing

## 2025-06-19 NOTE — CONSULTS
[facility] ([facility])  OB Initial Discharge Assessment    Swer completed discharge planning assessment with pt at bedside. Pt was easily engaged. Education on the role of  was provided. Emotional support provided throughout assessment.     Pt's first baby. FOB not involved, will not be signing birth certificate. Per pt baby has all essential items clothes, diapers, car seat, crib, etc. Swer inquired about prenatal care. Per pt/epic she did receive care. Pt's mother is her support system. Pt's brother will be her transportation at discharge. Pt denied any mental health diagnosis or any SI/HI.     SWER WILL REMAIN AVAILABLE THROUGHOUT PT'S ENTIRE STAY.     Baby's Name; Rachel Fritz   FOB's Name; N/A  WIC; Enrolled  Pediatrician; ORAL Haskins      SWER DISCUSSED POSTPARTUM DEPRESSION IN GREAT DETAIL.   Primary Care Provider: No primary care provider on file.    Expected Discharge Date: 6/19/2025    Initial Assessment (most recent)       OB Discharge Planning Assessment - 06/19/25 1116          OB Discharge Planning Assessment    Assessment Type Discharge Planning Assessment     Source of Information patient;health record     Verified Demographic and Insurance Information Yes      Contact Status none needed     People in Home parent(s)     Name(s) of People in Home Marie Fritz (mother) [phone]     Relationship Status None     Name of Support/Comfort Primary Source Marie Fritz (mother) [phone]     Other children (include names and ages) N/A     Employed Full Time     Employer [employer]     Currently Enrolled in School No     Highest Level of Education High School Diploma     Father's Involvement None     Is Father signing the birth certificate No     Father Currently Enrolled in School No     Father's Employer N/A     Father's Employer Phone Number N/A     Father's Job Title NA     Primary Contact Name and Number Marie Fritz (mother) [phone]     Received  Prenatal Care Yes     Transportation Anticipated family or friend will provide     Adoption Planned no     Infant Feeding Plan breastfeeding     Previous Breastfeeding Experience no     Does baby have crib or safe sleep space? Yes     Do you have a car seat? Yes     Pediatrician [facility]     Resource/Environmental Concerns none     Equipment Currently Used at Home none     DME Needed Upon Discharge  none     DCFS No indications (Indicators for Report)     Do you have any problems affording any of your prescribed medications? No                   Psychosocial (most recent)       OB Psychosocial Assessment - 06/19/25 1118          OB Psychosocial Assessment    Current or Previous  Service none     Anxieties, Fears or Concerns denied     Major Change/Loss/Stressor/Fears denies     Feels Unsafe at Home or Work/School no     Feels Threatened by Someone no     Does anyone try to keep you from having contact with others or doing things outside your home? no     Physical Signs of Abuse Present no     Have You Felt Down, Depressed or Hopeless? no     Have You Felt Little Interest or Pleasure in Doing Things? no     Feels Like Hurting Self None     Feels Like Hurting Others no     Have you ever experienced a traumatic event? no     Have you ever witnessed a violent act? no     Have you ever experienced a life threatening injury or near death encounter? no     Current/Active Substance Abuse No     Current/Active Behavioral Health Issues No                          Healthcare Directives:   Advance Directive  (If Adv Dir status is received, view document under Adv Dir in header or Chart Review Media tab): Patient does not have Advance Directive, declines information.

## 2025-06-19 NOTE — DISCHARGE SUMMARY
[facility] ([facility])  Obstetrics  Discharge Summary      Patient Name: Genia Fritz  MRN: [id number]  Admission Date: 2025  Hospital Length of Stay: 3 days  Discharge Date and Time: No discharge date for patient encounter.  Attending Physician: Hailee Lang MD   Discharging Provider: Jet Queen CNM   Primary Care Provider: No primary care provider on file.    HPI: 24 y.o.  38w1d here for IOL for FGR           * No surgery found *     Hospital Course:   Admit to LD for IOL  VE in office FT/thick/high  Cytotec   Epidural per patient request  Anticipate    25 PPD1-doing well, routine PP care    25     PPD 2, normal PP course, d/c home today         Final Active Diagnoses:    Diagnosis Date Noted POA    PRINCIPAL PROBLEM:  Shoulder dystocia during labor and delivery, delivered [O66.0] 2025 No    Second degree perineal laceration during delivery [O70.1] 2025 No    Single liveborn infant delivered vaginally [Z38.00] 2025 No    Childhood asthma without complication [J45.909] 2024 Yes      Problems Resolved During this Admission:    Diagnosis Date Noted Date Resolved POA    Poor fetal growth affecting management of mother in third trimester [O36.5930] 2024 Yes        Significant Diagnostic Studies: N/A      Feeding Method: breast    Immunizations       Date Immunization Status Dose Route/Site Given by    16 0000 Hepatitis A, Pediatric/Adolescent, 2 Dose Given  Intramuscular/Left arm     25 MMR Incomplete 0.5 mL Subcutaneous/     25 Tdap Incomplete 0.5 mL Intramuscular/             Delivery:    Episiotomy: None   Lacerations: 2nd   Repair suture:     Repair # of packets: 2   Blood loss (ml):       Birth information:  YOB: 2026   Time of birth: 6:31 PM   Sex: female   Delivery type: Vaginal, Spontaneous   Gestational Age: 38w2d     Measurements    Weight: 3060 g  Weight (lbs): 6 lb 11.9 oz  Length: 50  "cm  Length (in): 19.69"  Head circumference: 33.5 cm  Chest circumference: 30 cm  Abdominal girth: 30 cm         Delivery Clinician: Delivery Providers    Delivering clinician: Liu Townsend CNM   Provider Role    Lolly Baer RN Registered Nurse    Laureen Knapp RN Registered Nurse    Kurt Brady, Nurse Intern Nurse Intern    Brennan Chavez Nurse Intern    Elena Yi Medical Student    Myha Darden ST Registered Nurse             Additional  information:  Forceps:    Vacuum:    Breech:    Observed anomalies      Living?:     Apgars    Living status: Living  Apgar Component Scores:  1 min.:  5 min.:  10 min.:  15 min.:  20 min.:    Skin color:  0  1       Heart rate:  2  2       Reflex irritability:  2  2       Muscle tone:  1  2       Respiratory effort:  2  2       Total:  7  9       Apgars assigned by: ANDRE KNAPP RN/ANDRE CALERO RN         Placenta: Delivered:       appearance  Pending Diagnostic Studies:       None            Discharged Condition: stable    Disposition: Home or Self Care    Follow Up:   Follow-up Information       Liu Townsend CNM Follow up in 4 week(s).    Specialty: Obstetrics and Gynecology  Why: PP f/u  Contact information:  [address]  [place] [address]  [phone]                           Patient Instructions:      BREAST PUMP FOR HOME USE     Order Specific Question Answer Comments   Type of pump: Hospital grade electric    Weight: 83.5 kg (184 lb)    Does patient have medical equipment at home? none    Length of need (1-99 months): 99      Diet Adult Regular     No driving until:     Pelvic Rest     Notify your health care provider if you experience any of the following:  temperature >100.4     Notify your health care provider if you experience any of the following:  severe uncontrolled pain     Notify your health care provider if you experience any of the following:  severe persistent headache     Notify your health care provider if you experience " any of the following:  persistent dizziness, light-headedness, or visual disturbances     Medications:  Current Discharge Medication List        CONTINUE these medications which have NOT CHANGED    Details   prenatal 25/iron/folate 6/dha (PRENA1 ORAL) Take by mouth.             Jet Queen CNM  Obstetrics  [facility] ([facility])

## 2025-06-19 NOTE — LACTATION NOTE
Mother called for latching assistance.  Mother changed infant's stool diaper and is ready to latch infant.   Baby is showing feeding cues. Helped mother to settle in a cross cradle hold position on the left first then the right breast. Reviewed deep asymmetric latch and proper positioning. Guided mother's hand with latching process and she is able to demonstrate back and deep latch easily obtained. Audible swallows noted, and mother denies pain or discomfort. Baby fed until content, and nipple shape and color is WDL upon unlatching. Reviewed hand expression and nipple care; mother able to return back demonstration.      Mother anticipates discharge home later today. Reviewed signs of good attachment. Reviewed breast massage and compression during feedings and indications for use. Reviewed signs of effective milk transfer and instructed to call pediatrician and lactation if signs not present.     Signs of good a good latch:    -  Infant is calm and sucking contentedly    -  Infants mouth covering a large part of the areola     -  Chin indents breast     -  Lips flanged or turned outward    -  Wide mouth corner angle    -  Tongue is over lower gum    -  Rhythmic sucking and audible swallow    -  Rounded cheek and no dimpling    -  Infant maintains latch     -  The latch is comfortable and pain-free        Discussed expected feeding and output pattern for days of life 2, 3, 4, & 5+; mother instructed to call pediatrician and lactation if infant is not meeting feeding and output goals. Expected oral intake per feeding (according to American Academy of Breastfeeding Medicine) & expected output for each day of life:    Day 2: 5-15 mL per feeding, 2 voids, 2 stools    Day 3: 15-30 mL per feeding, 3 voids, 3 stools    Day 4: 30-60 mL per feeding, 4 voids, 3 stools    After the 4th day and your milk is in:    a. Babys stool should turn bright yellow and be loose, watery, and seedy    b. Baby should have at least 3-4 poops  the size of the palm of your hand per day    c. Baby should have at least 5-6 wet diapers per day    d. Babys urine should be light yellow in color        Discussed that formula supplementation would be medically necessary when infant is not getting the expected daily intake and output requirement.        Reviewed signs of engorgement and expectant management. Reviewed signs of mastitis and instructed mother to call OB provider and lactation if any signs present. Reviewed proper milk handling, collection, and storage guidelines. Reviewed nursing diet and nutrition. Discussed resources for medication safety while breastfeeding. Reviewed available outpatient lactation resources.        Discussed proper use of First Alert (Breastfeeding Questionnaire) Form. This is a loose form inside your Guide to Postpartum & [place] Care. These questions should be reviewed once your baby is 5 days old and any time afterward. This questionnaire helps you to determine how well you and the baby are breastfeeding and may alert you to any real or potential breastfeeding problems. All answers should land in column A. If any answers are in column B, please call the Lactation Warmline for assistance at [phone].       BREASTFEEDING QUESTIONNAIRE     These questions should be reviewed once the baby is 5 days old and any time afterward.    This questionnaire helps you to determine how well you and the baby are breastfeeding and may alert you to any real or potential breastfeeding problems. All answers should land in column A.         IF ANY ANSWERS ARE IN COLULMN B, PLEASE CALL THE     LACTATION WARMLINE FOR ASSISTANCE [phone]     Question   A   B*     Has your milk come in?   yes   no     Are you able to: easily latch the baby to both breasts &/or easily and comfortably pump both breasts?   yes   no     Do you have very sore, tender nipples?   no   yes     Are your nipples scabbed, cracked, blistered, or bleeding?   no   yes      Do you usually have to wake the baby to feed?   no   yes     Does the baby feed at least 8 or more times in a 24-hour period?   yes   no     Do you hear/see the baby swallowing after every 1-3 sucks throughout the breastfeeding session &/or does the baby bottle-feed comfortably and without difficulty?   yes   no     Do your breasts feel softer: after the baby has finished nursing &/or after you have finished pumping?   yes   no     Does the baby act hungry by rooting/sucking on his fingers after feeding or within 30 minutes of feeding?   no   yes     Have the baby's bowel movements changed from dark & sticky to bright yellow, soft, watery, & seedy?   yes   no     Does the baby have at least 3 or 4 medium sized stools in a 24-hour period?   yes   no     Does the baby have dark yellow or pink colored urine diapers?   no   yes     Are you more comfortable and confident about breastfeeding?   yes   no       Screening Form Early Follow-up of Breast-fed Infants Adapted with permission from [employer] [facility], [place], [place]                                                                                                                                             Avoid pacifiers and bottles for the first 4 weeks to aid in establishing your milk supply. Your baby should be examined by a pediatrician at 3-5 days of age and again at 2 weeks of age. Once your milk production increases, the baby should be gaining at least ½ - 1oz each day and should be back to birthweight no later than 10-14 days of age. If this is not the case, please call the Breastfeeding Warmline [phone] for assistance and support.    Satisfy your hunger and thirst by eating when hungry, snacking often, and staying hydrated. Keep healthy snacks and water in the space where you usually breastfeed or pump. Take naps as needed and rest when your baby is sleeping.        Your Guide to Postpartum & [place] Care has QR codes throughout. A QR code is a  black and white square barcode that can contain information such as a web link for detailed information or video and can be scanned easily by a smartphone camera.     Making Milk: page 36    Feeding cues: page 36    Laid-Back Latch Position: page 37    Medication and Drug: page 41    Hand Expression: page 42         Breastfeeding Videos:     [employer]org       Attaching Your Baby at the Breast - Video - [employer]    It teaches and empowers mothers and caregivers.         Community Resources    [facility] Breastfeeding Warmline: [phone]. To schedule an outpatient lactation consultation: [phone]    Infant Risk Center: Is a call center that provides information about the safety of taking medications while breastfeeding. Call [phone], M-F, 8am-5pm, [place].    Local [facility] clinics/Partners for Healthy Babies: It provides incentives and breast pumps to eligible mothers. Connects [place] mothers to health and pregnancy resources, services, and information. 2-327-989-BABY (1128).       [employer]ait: Is a breastfeeding support group for women of color. Meetings are held every 4th Sundays of each month at 2:30 PM. Text BRCAFE to 47[address]. [email or web address].com/groups/[facility]     [employer] of [place]: (824) 057-MFHR (3433) [employer].org/service/[facility]s-milk-bank-[facility]     Mothers Milk Bank [facility]    For obtaining donor milk or donating milk.        Mother avoided eye contact but pleasantly welcomed the latching assistance during this entire encounter. She verbalizes understanding of the information provided. May need reenforcement of the above discharge information to mother. Encouraged mother to contact lactation with any questions, concerns, or problems, contact number provided.

## 2025-06-19 NOTE — PROGRESS NOTES
"[facility] ([facility])  Obstetrics  Postpartum Progress Note    Patient Name: Genia Fritz  MRN: [id number]  Admission Date: 2025  Hospital Length of Stay: 3 days  Attending Physician: Hailee Lang MD  Primary Care Provider: No primary care provider on file.    Subjective:     Principal Problem:Shoulder dystocia during labor and delivery, delivered    Hospital Course:  Admit to LD for IOL  VE in office FT/thick/high  Cytotec   Epidural per patient request  Anticipate    24 PPD1-doing well, routine PP care    25     PPD 2, normal PP course, d/c home today    Interval History: PPD 2    She is doing well this morning. She is tolerating a regular diet without nausea or vomiting. She is voiding spontaneously. She is ambulating. She has passed flatus, and has not a BM. Vaginal bleeding is mild. She denies fever or chills. Abdominal pain is mild and controlled with oral medications. She Is breastfeeding. She desires circumcision for her male baby: not applicable.    Objective:     Vital Signs (Most Recent):  Temp: 98.3 °F (36.8 °C) (25 09)  Pulse: 84 (25 09)  Resp: 18 (25)  BP: 123/70 (25)  SpO2: 99 % (25) Vital Signs (24h Range):  Temp:  [98 °F (36.7 °C)-98.6 °F (37 °C)] 98.3 °F (36.8 °C)  Pulse:  [] 84  Resp:  [16-18] 18  SpO2:  [98 %-99 %] 99 %  BP: (114-129)/(64-77) 123/70     Weight: 83.5 kg (184 lb)  Body mass index is 32.59 kg/m².    No intake or output data in the 24 hours ending 25      Significant Labs:  Lab Results   Component Value Date    GROUPTRH B POS 2025    HEPBSAG Non-reactive 2024     No results for input(s): "HGB", "HCT" in the last 48 hours.    I have personallly reviewed all pertinent lab results from the last 24 hours.  Recent Lab Results       None            Physical Exam:   Constitutional: She is oriented to person, place, and time. She appears well-developed and well-nourished.    HENT: "   Head: Normocephalic.      Cardiovascular:  Normal rate.             Pulmonary/Chest: Effort normal.        Abdominal: Soft.     Genitourinary: There is vaginal discharge in the vagina.    Genitourinary Comments:  lochia small/mod per pt             Musculoskeletal: Normal range of motion and moves all extremeties.       Neurological: She is alert and oriented to person, place, and time.    Skin: Skin is warm and dry.    Psychiatric: She has a normal mood and affect. Her behavior is normal. Judgment and thought content normal.       Review of Systems   Eyes:  Negative for visual disturbance.   Gastrointestinal:  Negative for abdominal pain.   Genitourinary:  Positive for vaginal bleeding.   Musculoskeletal:  Negative for back pain.   Neurological:  Negative for headaches.   All other systems reviewed and are negative.    Assessment/Plan:     24 y.o. female  for:    * Shoulder dystocia during labor and delivery, delivered  Discussed SD, important to note in medical history for subsequent pregnancies    Single liveborn infant delivered vaginally  Care under peds    Second degree perineal laceration during delivery  Routine PP ronna care    Childhood asthma without complication  Remains stable on no meds        Disposition: As patient meets milestones, will plan to discharge today.    Jet Queen CNM  Obstetrics  [facility] ([facility])

## 2025-06-19 NOTE — LACTATION NOTE
Lactation Rounds:   Mother reports that breastfeeding is going well. She denies any pain or discomfort while breastfeeding. Mother denies nipple soreness or injury.     Infant is asleep in the bassinet she  at 2135. Mother reports that infant voided x3 and stooled x1 today. Praise and encouragement provided, and mother was reassured.     Reviewed the expected feeding patterns and importance of cluster feeding to promote milk production. Reviewed that infant should be fed on demand, at least 8 times a day. Reviewed proper positioning, signs of good attachment, and effective milk transfer. Hand expression encouraged to assist with cluster feeding. Stressed the significance of frequent and uninterrupted skin-to-skin contact with the infant. Discussed the American Academy of Breastfeeding Medicine guidelines for oral daily intake and output:  Day 2: 5-15 mL per feeding, 2 voids, 2 stools  Day 3: 15-30 mL per feeding, 3 voids, 3 stools  Day 4: 30-45 mL per feeding, 4 voids, 3 stools     Mother denies any further lactation needs or concerns at this time. Lactation availability discussed. Encouraged mother to call for assistance when desired or when infant is showing signs of hunger. Mother verbalizes understanding of all education and counseling.

## 2025-06-19 NOTE — LACTATION NOTE
Lactation Rounds:    Infant weight loss - 5.1% 5 voids and 2 stools documented in the last 24 hours.     Mother reports infant is breastfeeding well.     Mother anticipates discharge home today. Reinforced discharge teaching previously taught by prior lactation consultant. Mother denies questions/ concerns r/t discharge teaching.    Reviewed signs of effective milk transfer and instructed to call pediatrician and lactation if signs not present. Discussed expected feeding and output pattern for days of life 2, 3, 4, & 5+; mother instructed to call pediatrician and lactation if infant is not meeting feeding and output goals.     Reviewed signs of engorgement and expectant management. Reviewed signs of mastitis and instructed mother to call OB provider and lactation if any signs present. Discussed proper use of First Alert Form. Reviewed proper milk handling, collection and storage guidelines. Reviewed nursing diet and nutrition. Discussed resources for medication safety while breastfeeding. Reviewed available outpatient lactation resources. SIDS handouts and safety sheets reviewed.     Mother verbalizes understanding of all education and counseling; she denies any further lactation needs or concerns at this time. Encouraged mother to contact lactation with any questions, concerns, or problems, contact number provided.     Primary nurse updated.

## 2025-06-19 NOTE — SUBJECTIVE & OBJECTIVE
"Interval History: PPD 2    She is doing well this morning. She is tolerating a regular diet without nausea or vomiting. She is voiding spontaneously. She is ambulating. She has passed flatus, and has not a BM. Vaginal bleeding is mild. She denies fever or chills. Abdominal pain is mild and controlled with oral medications. She Is breastfeeding. She desires circumcision for her male baby: not applicable.    Objective:     Vital Signs (Most Recent):  Temp: 98.3 °F (36.8 °C) (06/19/25 0909)  Pulse: 84 (06/19/25 0909)  Resp: 18 (06/19/25 0909)  BP: 123/70 (06/19/25 0909)  SpO2: 99 % (06/19/25 0909) Vital Signs (24h Range):  Temp:  [98 °F (36.7 °C)-98.6 °F (37 °C)] 98.3 °F (36.8 °C)  Pulse:  [] 84  Resp:  [16-18] 18  SpO2:  [98 %-99 %] 99 %  BP: (114-129)/(64-77) 123/70     Weight: 83.5 kg (184 lb)  Body mass index is 32.59 kg/m².    No intake or output data in the 24 hours ending 06/19/25 0923      Significant Labs:  Lab Results   Component Value Date    GROUPTRH B POS 06/16/2025    HEPBSAG Non-reactive 12/18/2024     No results for input(s): "HGB", "HCT" in the last 48 hours.    I have personallly reviewed all pertinent lab results from the last 24 hours.  Recent Lab Results       None            Physical Exam:   Constitutional: She is oriented to person, place, and time. She appears well-developed and well-nourished.    HENT:   Head: Normocephalic.      Cardiovascular:  Normal rate.             Pulmonary/Chest: Effort normal.        Abdominal: Soft.     Genitourinary: There is vaginal discharge in the vagina.    Genitourinary Comments:  lochia small/mod per pt             Musculoskeletal: Normal range of motion and moves all extremeties.       Neurological: She is alert and oriented to person, place, and time.    Skin: Skin is warm and dry.    Psychiatric: She has a normal mood and affect. Her behavior is normal. Judgment and thought content normal.       Review of Systems   Eyes:  Negative for visual " disturbance.   Gastrointestinal:  Negative for abdominal pain.   Genitourinary:  Positive for vaginal bleeding.   Musculoskeletal:  Negative for back pain.   Neurological:  Negative for headaches.   All other systems reviewed and are negative.

## 2025-06-19 NOTE — DISCHARGE INSTRUCTIONS
"Mother Self Care:    Activity: Avoid strenuous exercise and get adequate rest.  No driving until the physician consent given.  Emotional Changes: Most women find birth to be a time of great emotional upheaval.  Sense of loss, mood swings, fatigue, anxiety, and feeling "let down" are common.  If feelings worsen or last more than a week, call your physician.  Breast Care/Breastfeeding: Wear a bra for comfort.  Keep nipples dry and apply your own breast milk or lanolin cream as needed for soreness.  Engorgement can be relieved with warm, moist heat before feedings.  You may also take Ibuprofen.  Breast Care/Bottle Feeding: Wear support bra 24 hours a day for one week.  Avoid stimulation to breasts.  You may use ice packs for discomfort.  Ronna-Care/Vaginal Bleeding: Remember to use your ronna-bottle after urinating.  Your flow will change from red, to pink, to yellow/white color over a period of 2 weeks.  Menstruation will return in 3-8 weeks, or longer if breastfeeding.  Episiotomy Vaginal Delivery: Stitches will dissolve within 10 days to 3 weeks.  Warm baths, tucks, and dermoplast will promote healing.  Avoid bubble baths or strong soaps.   Section/Tubal Ligation: Keep incision clean and dry. You may shower, but avoid baths. Please continue to use Nozin twice a day for 7 days after surgery. Ensure you attend your 1 week post op visit to have your dressing removed. Use antibacterial soap to wash your entire body until directed otherwise by a Physician, it is very important to shower daily. If you become concerned about the way your incision site looks, please contact your providers office.   Sexual Activity/Pelvic Rest: No sexual activity, tampons, or douching until your physician gives you consent.  Diet: Continue to eat from the five basic food groups, including plenty of protein, fruits, vegetables, and whole grains.  Limit empty calories and high fat foods.  Drink enough fluids to satisfy thirst and add an " "extra 500 calories for breastfeeding.  Constipation/Hemorrhoids: Drink plenty of water.  You may take a stool softener or natural laxative (Metamucil). You may use tucks or hemorrhoid ointment and soak in a warm tub.    "What does help look like to you when you go home?"  "Is there any need that you anticipate that we may be able to assist with?"    CALL YOUR OB DOCTOR IF ANY OF THE FOLLOWING OCCURS:  *Heavy bleeding - saturating a pad an hour or passing any large (2-3 inches in size) blood clots.  *Any pain, redness, or tenderness in lower leg.  *You cannot care for yourself or your baby.  *Any signs of infection-      - Temperature greater than 100.5 degrees F      - Foul smelling vaginal discharge and/or incisional drainage      - Increased episiotomy or incisional pain      - Hot, hard, red or sore area on breast      - Flu-like symptoms      - Any urgency, frequency or burning with urination    Return To the Hospital for further Evaluation:  Headache not relieved by tylenol or ibuprofen  Blurry vision, double vision, seeing spots, or flashing lights  Feeling faint or passing out  Right epigastric pain  Difficulty breathing  Swelling in hands, face, or feet  Any of these symptoms accompanied by nausea/vomiting  Gaining more than 5 pounds in one week  Seizures  These symptoms could be an indication of elevated blood pressure.     For patients that were treated for high blood pressure during pregnancy and or your hospital stay you will need a blood pressure check three days after you leave the hospital. Your nursing staff will assist you in an appointment if needed. If you have Connected Mom you may use your blood pressure cuff and report any readings 140/90 to your provider immediately.       If you have any questions that need to be answered immediately please call the Labor & Delivery Unit at [phone] and ask to speak to a nurse.      Please see [facility] BLUE folder for additional information and handouts. "

## 2025-07-21 ENCOUNTER — PATIENT MESSAGE (OUTPATIENT)
Dept: RESEARCH | Facility: HOSPITAL | Age: 25
End: 2025-07-21
Payer: MEDICAID

## 2025-07-24 ENCOUNTER — POSTPARTUM VISIT (OUTPATIENT)
Dept: OBSTETRICS AND GYNECOLOGY | Facility: CLINIC | Age: 25
End: 2025-07-24
Payer: MEDICAID

## 2025-07-24 VITALS
DIASTOLIC BLOOD PRESSURE: 74 MMHG | WEIGHT: 154.31 LBS | SYSTOLIC BLOOD PRESSURE: 119 MMHG | BODY MASS INDEX: 27.34 KG/M2 | HEIGHT: 63 IN

## 2025-07-24 PROBLEM — O98.811 CHLAMYDIA INFECTION AFFECTING PREGNANCY IN FIRST TRIMESTER: Status: RESOLVED | Noted: 2024-11-25 | Resolved: 2025-07-24

## 2025-07-24 PROBLEM — Z87.59 HISTORY OF SHOULDER DYSTOCIA IN PRIOR PREGNANCY: Status: ACTIVE | Noted: 2025-06-17

## 2025-07-24 PROBLEM — A74.9 CHLAMYDIA INFECTION AFFECTING PREGNANCY IN FIRST TRIMESTER: Status: RESOLVED | Noted: 2024-11-25 | Resolved: 2025-07-24

## 2025-07-24 PROCEDURE — 99213 OFFICE O/P EST LOW 20 MIN: CPT | Mod: PBBFAC,TH,PO | Performed by: ADVANCED PRACTICE MIDWIFE

## 2025-07-24 PROCEDURE — 99999 PR PBB SHADOW E&M-EST. PATIENT-LVL III: CPT | Mod: PBBFAC,,, | Performed by: ADVANCED PRACTICE MIDWIFE

## 2025-07-24 NOTE — PROGRESS NOTES
"Subjective:       Genia Fritz is a 24 y.o. female who presents for a postpartum visit. She is 4 weeks postpartum following a vaginal delivery. I have fully reviewed the prenatal and intrapartum course. The delivery was at 38.2 gestational weeks d/t IUGR IOL. Outcome: spontaneous vaginal delivery. Anesthesia: epidural. Postpartum course has been uncomplicated. Baby's course has been uncomplicated. Bonding with baby well. Baby is feeding by formula. Bleeding staining only. Bowel function is normal. Bladder function is normal. Patient is not sexually active. Contraception method is none. Postpartum depression screening: negative.    The following portions of the patient's history were reviewed and updated as appropriate: allergies, current medications, past family history, past medical history, past social history, past surgical history, and problem list.    Review of Systems  A comprehensive review of systems was negative.     Objective:      /74 (Patient Position: Sitting)   Ht 5' 3" (1.6 m)   Wt 70 kg (154 lb 5.2 oz)   LMP 09/22/2024 (Exact Date)   Breastfeeding No   BMI 27.34 kg/m²    General:  alert, appears stated age, and cooperative               Abdomen: normal findings: soft, non-tender    Vulva:  normal   Vagina: normal vagina, no discharge, exudate, lesion, or erythema                          Assessment:      Routine postpartum exam. Pap smear not done at today's visit.     Plan:      1. Contraception: none  2. Pap not done, last pap smear done 11/14/24 WNL, next due 11/14/2027  3. Follow up in: 1 year for annual exam or as needed.     "

## 2025-07-24 NOTE — LETTER
July 24, 2025      [facility]  [address] [place] [address]  Phone: [phone]       Patient: Genia Fritz   YOB: 2000  Date of Visit: 07/24/2025    To Whom It May Concern:    Genia Fritz  was at [facility] on 07/24/2025. She may return to work/school on 07/29/2025 with no restrictions. If you have any questions or concerns, or if I can be of further assistance, please do not hesitate to contact me.    Sincerely,    Dutch Mcgraw MA

## 2025-08-05 ENCOUNTER — PATIENT MESSAGE (OUTPATIENT)
Dept: OBSTETRICS AND GYNECOLOGY | Facility: CLINIC | Age: 25
End: 2025-08-05
Payer: MEDICAID